# Patient Record
Sex: MALE | Race: WHITE | NOT HISPANIC OR LATINO | Employment: FULL TIME | URBAN - METROPOLITAN AREA
[De-identification: names, ages, dates, MRNs, and addresses within clinical notes are randomized per-mention and may not be internally consistent; named-entity substitution may affect disease eponyms.]

---

## 2018-02-06 ENCOUNTER — HOSPITAL ENCOUNTER (OUTPATIENT)
Dept: RADIOLOGY | Facility: MEDICAL CENTER | Age: 53
End: 2018-02-06
Attending: EMERGENCY MEDICINE
Payer: COMMERCIAL

## 2018-02-06 ENCOUNTER — OCCUPATIONAL MEDICINE (OUTPATIENT)
Dept: URGENT CARE | Facility: PHYSICIAN GROUP | Age: 53
End: 2018-02-06
Payer: COMMERCIAL

## 2018-02-06 ENCOUNTER — TELEPHONE (OUTPATIENT)
Dept: URGENT CARE | Facility: PHYSICIAN GROUP | Age: 53
End: 2018-02-06

## 2018-02-06 VITALS
DIASTOLIC BLOOD PRESSURE: 100 MMHG | OXYGEN SATURATION: 96 % | TEMPERATURE: 98.4 F | BODY MASS INDEX: 34.17 KG/M2 | RESPIRATION RATE: 18 BRPM | WEIGHT: 245 LBS | HEART RATE: 84 BPM | SYSTOLIC BLOOD PRESSURE: 170 MMHG

## 2018-02-06 DIAGNOSIS — S52.021A CLOSED FRACTURE OF RIGHT OLECRANON PROCESS, INITIAL ENCOUNTER: ICD-10-CM

## 2018-02-06 DIAGNOSIS — S50.01XA CONTUSION OF RIGHT ELBOW, INITIAL ENCOUNTER: ICD-10-CM

## 2018-02-06 PROCEDURE — 99214 OFFICE O/P EST MOD 30 MIN: CPT | Performed by: EMERGENCY MEDICINE

## 2018-02-06 PROCEDURE — 73200 CT UPPER EXTREMITY W/O DYE: CPT | Mod: RT

## 2018-02-06 PROCEDURE — 73080 X-RAY EXAM OF ELBOW: CPT | Mod: RT

## 2018-02-06 RX ORDER — HYDROCODONE BITARTRATE AND ACETAMINOPHEN 5; 325 MG/1; MG/1
1 TABLET ORAL EVERY 6 HOURS PRN
Qty: 20 TAB | Refills: 0 | Status: SHIPPED | OUTPATIENT
Start: 2018-02-06 | End: 2018-02-07

## 2018-02-06 ASSESSMENT — ENCOUNTER SYMPTOMS
NERVOUS/ANXIOUS: 0
FALLS: 1
MYALGIAS: 0
NAUSEA: 0
SEIZURES: 0
VOMITING: 0
EYE REDNESS: 0
LOSS OF CONSCIOUSNESS: 0
EYE DISCHARGE: 0
NECK PAIN: 0
CHILLS: 0
ABDOMINAL PAIN: 0
FEVER: 0

## 2018-02-06 NOTE — LETTER
[facility] [address] [place] [address]  Phone:  [phone] - Fax:  [phone]   [facility] Progress Report and Disability Certification  Date of Service: 2/6/2018   No Show:  No  Date / Time of Next Visit: 2/7/2018 with [facility] orthopedics    Claim Information   Patient Name: Kg Ortega  Claim Number:     Employer:    Date of Injury: 2/6/2018     Insurer / TPA: [employer]  ID / SSN:     Occupation:   Diagnosis: Diagnoses of Contusion of right elbow, initial encounter and Closed fracture of right olecranon process, initial encounter were pertinent to this visit.    Medical Information   Related to Industrial Injury? Yes    Subjective Complaints:  DOI : 2/6/18  Pt fell down flight of stairs on job site for [employer] at 2:30 Pt injured right elbow in the fall. No LOC or neck pain , patient is left-hand. Patient has no decreased sensation in his right upper extremity   Objective Findings: Patient's vital signs her blood pressure went over 100, pulse 84 and regular. Rate is per minute with a pulse oximetry of 96% in the temperature of 36.9.    Patient has an extremely swollen right elbow primarily involving his olecranon bursa. X-rays show a comminuted fracture   Pre-Existing Condition(s): None   Assessment:   Initial Visit    Status: Additional Care Required  Permanent Disability:  Comments:unknown    Plan: MedicationTransfer Care  Comments:patient will have x-rays be placed in H splint and referred to orthopedic surgery. Patient will be given Norco for his pain. He will not drive. He'll be a transfer care to orthopedic surgery.    Diagnostics: X-rayCT  Comments:both elbow x-ray and CT show comminuted fracture of the right olecranon with distraction of 2.5 cm large hematoma.    Comments:  Dr. Guadalupe was consulted ×2 the final decision was to have the patient follow-up tomorrow at [facility] orthopedics to  schedule surgery patient was given a referral as well as narcotics. He was placed in a sling at 120°. With a sling and ice    Disability Information   Status: Temporarily Totally Disabled    From:  2/6/2018  Through: 2/7/2018 Restrictions are: Temporary   Physical Restrictions   Sitting:    Standing:    Stooping:    Bending:      Squatting:    Walking:    Climbing:    Pushing:      Pulling:    Other:    Reaching Above Shoulder (L):   Reaching Above Shoulder (R):       Reaching Below Shoulder (L):    Reaching Below Shoulder (R):      Not to exceed Weight Limits   Carrying(hrs):   Weight Limit(lb):   Lifting(hrs):   Weight  Limit(lb):     Comments:      Repetitive Actions   Hands: i.e. Fine Manipulations from Grasping:     Feet: i.e. Operating Foot Controls:     Driving / Operate Machinery:     Physician Name: Guillermo Parrish M.D. Physician Signature: JIHAN Hull M.D. e-Signature: Dr. Lucius Botello, Medical Director   Clinic Name / Location: [facility] [address] [address] Clinic Phone Number: Dept: [phone]   Appointment Time: 4:00 Pm Visit Start Time: 3:59 PM   Check-In Time:  3:58 Pm Visit Discharge Time: 5:41 PM    Original-Treating Physician or Chiropractor    Page 2-Insurer/TPA    Page 3-Employer    Page 4-Employee

## 2018-02-06 NOTE — LETTER
EMPLOYEE’S CLAIM FOR COMPENSATION/ REPORT OF INITIAL TREATMENT  FORM C-4    EMPLOYEE’S CLAIM - PROVIDE ALL INFORMATION REQUESTED   First Name  Kg Last Name  Jordan Birthdate                    1965                Sex  male Claim Number   Home Address  [address] Age  52 y.o. Height  5F 11I Weight  111.1 kg (245 lb) [facility]     [facility] Zip  [address] Telephone  [phone] (home)    Mailing Address  [address] [place] Zip  [address] Primary Language Spoken  English    Insurer   Third Party   [facility]   Employee's Occupation (Job Title) When Injury or Occupational Disease Occurred      Employer's Name     Telephone  [phone]    Employer Address  [address] [address]  [employer] [place] [facility]  [id number]    Date of Injury  2/6/2018               Hour of Injury  2:30 PM Date Employer Notified  2/6/2018 Last Day of Work after Injury or Occupational Disease  2/6/2018 Supervisor to Whom Injury Reported  Ron Odell   Address or Location of Accident (if applicable)  [[facility]]   What were you doing at the time of accident? (if applicable)  [employer] up the [place]    How did this injury or occupational disease occur? (Be specific an answer in detail. Use additional sheet if necessary)  Fell fown stairs halling matreal up stuck pipe into stud   If you believe that you have an occupational disease, when did you first have knowledge of the disability and it relationship to your employment?  n/a Witnesses to the Accident  ron odell      Nature of Injury or Occupational Disease  Strain  Part(s) of Body Injured or Affected  Elbow (R), ,     I certify that the above is true and correct to the best of my knowledge and that I have provided this information in order to obtain the benefits of [place]’s [place]ise[place]  I hereby authorize any physician, chiropractor, surgeon, practitioner, or other person, any hospital, including [facility] or [facility] hospital, any medical service organization, any insurance company, or other institution or organization to release to each other, any medical or other information, including benefits paid or payable, pertinent to this injury or disease, except information relative to diagnosis, treatment and/or counseling for AIDS, psychological conditions, alcohol or controlled substances, for which I must give specific authorization.  A Photostat of this authorization shall be as valid as the original.     Date   Place   Employee’s Signature   THIS REPORT MUST BE COMPLETED AND MAILED WITHIN 3 WORKING DAYS OF TREATMENT   Place  [facility]  Name of Facility  [place]   Date  2/6/2018 Diagnosis  (S50.01XA) Contusion of right elbow, initial encounter  (S52.021A) Closed fracture of right olecranon process, initial encounter Is there evidence the injured employee was under the influence of alcohol and/or another controlled substance at the time of accident?   Hour  3:59 PM Description of Injury or Disease  Diagnoses of Contusion of right elbow, initial encounter and Closed fracture of right olecranon process, initial encounter were pertinent to this visit. No   Treatment  Patient had plain x-rays and a CT. Conversations were had with orthopedic surgery on call Dr. Pulido who recommended follow-up tomorrow in his office patient will call [phone] ([facility] orthopedics) for an appointment with follow-up scheduled for planned surgery the following day or the next Monday.  Have you advised the patient to remain off work five days or more? Yes   X-Ray Findings  Positive   If Yes   From Date2/6/2018 To Date  2/7/2018   From information given by the employee, together with medical evidence, can you directly  "connect this injury or occupational disease as job incurred?  Yes If No Full Duty  No Modified Duty  No   Is additional medical care by a physician indicated?  Yes If Modified Duty, Specify any Limitations / Restrictions  Please refer to the D-39 form patient will receive transfer care to orthopedic surgery.   Do you know of any previous injury or disease contributing to this condition or occupational disease?                            No   Date  2/6/2018 Print Doctor’s Name Guillermo Parrish M.D. I certify the employer’s copy of  this form was mailed on:   Address  [address]  [facility] Insurer’s Use Only     [facility] Zip  [address]    Provider’s Tax ID Number  [id number] Telephone  Dept: [phone]        e-JIHAN Kerns M.D.   e-Signature: Dr. Lucius Botello, Medical Director Degree  MD WILSON-TREATING PHYSICIAN OR CHIROPRACTOR    PAGE 2-INSURER/TPA    PAGE 3-EMPLOYER    PAGE 4-EMPLOYEE             Form C-4 (rev10/07)              BRIEF DESCRIPTION OF RIGHTS AND BENEFITS  (Pursuant to [place] 616C.050)    Notice of Injury or Occupational Disease (Incident Report Form C-1): If an injury or occupational disease (OD) arises out of and in the  course of employment, you must provide written notice to your employer as soon as practicable, but no later than 7 days after the accident or  OD. Your employer shall maintain a sufficient supply of the required forms.    Claim for Compensation (Form C-4): If medical treatment is sought, the form C-4 is available at the place of initial treatment. A completed  \"Claim for Compensation\" (Form C-4) must be filed within 90 days after an accident or OD. The treating physician or chiropractor must,  within 3 working days after treatment, complete and mail to the employer, the employer's insurer and third-party , the Claim for  Compensation.    Medical Treatment: If you require medical treatment for your on-the-job injury or OD, you may be " required to select a physician or  chiropractor from a list provided by your workers’ compensation insurer, if it has contracted with an Organization for Managed Care (MCO) or  Preferred Provider Organization (PPO) or providers of health care. If your employer has not entered into a contract with an MCO or PPO, you  may select a physician or chiropractor from the Panel of Physicians and Chiropractors. Any medical costs related to your industrial injury or  OD will be paid by your insurer.    Temporary Total Disability (TTD): If your doctor has certified that you are unable to work for a period of at least 5 consecutive days, or 5  cumulative days in a 20-day period, or places restrictions on you that your employer does not accommodate, you may be entitled to TTD  compensation.    Temporary Partial Disability (TPD): If the wage you receive upon reemployment is less than the compensation for TTD to which you are  entitled, the insurer may be required to pay you TPD compensation to make up the difference. TPD can only be paid for a maximum of 24  months.    Permanent Partial Disability (PPD): When your medical condition is stable and there is an indication of a PPD as a result of your injury or  OD, within 30 days, your insurer must arrange for an evaluation by a rating physician or chiropractor to determine the degree of your PPD. The  amount of your PPD award depends on the date of injury, the results of the PPD evaluation and your age and wage.    Permanent Total Disability (PTD): If you are medically certified by a treating physician or chiropractor as permanently and totally disabled  and have been granted a PTD status by your insurer, you are entitled to receive monthly benefits not to exceed 66 2/3% of your average  monthly wage. The amount of your PTD payments is subject to reduction if you previously received a PPD award.    Vocational Rehabilitation Services: You may be eligible for vocational rehabilitation  services if you are unable to return to the job due to a  permanent physical impairment or permanent restrictions as a result of your injury or occupational disease.    Transportation and Per Alethea Reimbursement: You may be eligible for travel expenses and per alethea associated with medical treatment.    Reopening: You may be able to reopen your claim if your condition worsens after claim closure.    Appeal Process: If you disagree with a written determination issued by the insurer or the insurer does not respond to your request, you may  appeal to the Department of Administration, , by following the instructions contained in your determination letter. You must  appeal the determination within 70 days from the date of the determination letter at [address], [address], [place], [place]  [address], or [address], [place], [place] [address]. If you disagree with the  decision, you may appeal to the  Department of Administration, . You must file your appeal within 30 days from the date of the  decision  letter at [address], [address], [place], [place] [address], or [address], [place], [place] [address]. If you  disagree with a decision of an , you may file a petition for judicial review with the District Court. You must do so within 30  days of the Appeal Officer’s decision. You may be represented by an  at your own expense or you may contact the [facility] for possible  representation.    [place]  for Injured Workers (NAIW): If you disagree with a  decision, you may request that NAIW represent you  without charge at an  Hearing. For information regarding denial of benefits, you may contact the [facility] at: [address], [address], [place] [address], [phone], or [address], [place] [address], [phone]  [phone]    To File a Complaint with the Division: If you wish to file a complaint with the  of the Division of Industrial Relations (DIR),  please contact the Workers’ Compensation Section, [address], [address], [place], [place] [address], telephone ([phone]) [phone], or  [address], [address], [place], [place] [address], telephone [phone]) [phone].    For assistance with Workers’ Compensation Issues: you may contact the Office of the Governor Consumer Health Assistance, [address] [place] [address], [address], [place], [place] [address], Toll Free 1-659.701.2644, Web site: http://govcha.[facility].[place].us, E-mail  [email or web address].                                                                                                                                                                                                                                   __________________________________________________________________                                                                   _________________                Employee Name / Signature                                                                                                                                                       Date                                                                                                                                                                                                     D-2 (rev. 10/07)

## 2018-02-07 ENCOUNTER — APPOINTMENT (OUTPATIENT)
Dept: RADIOLOGY | Facility: MEDICAL CENTER | Age: 53
End: 2018-02-07
Attending: ORTHOPAEDIC SURGERY
Payer: COMMERCIAL

## 2018-02-07 ENCOUNTER — TELEPHONE (OUTPATIENT)
Dept: URGENT CARE | Facility: PHYSICIAN GROUP | Age: 53
End: 2018-02-07

## 2018-02-07 ENCOUNTER — HOSPITAL ENCOUNTER (OUTPATIENT)
Facility: MEDICAL CENTER | Age: 53
End: 2018-02-08
Attending: EMERGENCY MEDICINE | Admitting: ORTHOPAEDIC SURGERY
Payer: COMMERCIAL

## 2018-02-07 DIAGNOSIS — G89.18 ACUTE POST-OPERATIVE PAIN: ICD-10-CM

## 2018-02-07 DIAGNOSIS — S42.401B ELBOW FRACTURE, RIGHT, OPEN, INITIAL ENCOUNTER: ICD-10-CM

## 2018-02-07 LAB
ALBUMIN SERPL BCP-MCNC: 4.3 G/DL (ref 3.2–4.9)
ALBUMIN/GLOB SERPL: 1.3 G/DL
ALP SERPL-CCNC: 67 U/L (ref 30–99)
ALT SERPL-CCNC: 26 U/L (ref 2–50)
ANION GAP SERPL CALC-SCNC: 7 MMOL/L (ref 0–11.9)
APTT PPP: 26 SEC (ref 24.7–36)
AST SERPL-CCNC: 20 U/L (ref 12–45)
BASOPHILS # BLD AUTO: 0.1 % (ref 0–1.8)
BASOPHILS # BLD: 0.01 K/UL (ref 0–0.12)
BILIRUB SERPL-MCNC: 0.6 MG/DL (ref 0.1–1.5)
BUN SERPL-MCNC: 19 MG/DL (ref 8–22)
CALCIUM SERPL-MCNC: 9.2 MG/DL (ref 8.5–10.5)
CHLORIDE SERPL-SCNC: 102 MMOL/L (ref 96–112)
CO2 SERPL-SCNC: 28 MMOL/L (ref 20–33)
CREAT SERPL-MCNC: 0.95 MG/DL (ref 0.5–1.4)
EKG IMPRESSION: NORMAL
EOSINOPHIL # BLD AUTO: 0.05 K/UL (ref 0–0.51)
EOSINOPHIL NFR BLD: 0.5 % (ref 0–6.9)
ERYTHROCYTE [DISTWIDTH] IN BLOOD BY AUTOMATED COUNT: 43.1 FL (ref 35.9–50)
GLOBULIN SER CALC-MCNC: 3.2 G/DL (ref 1.9–3.5)
GLUCOSE SERPL-MCNC: 103 MG/DL (ref 65–99)
HCT VFR BLD AUTO: 44.9 % (ref 42–52)
HGB BLD-MCNC: 15.2 G/DL (ref 14–18)
IMM GRANULOCYTES # BLD AUTO: 0.06 K/UL (ref 0–0.11)
IMM GRANULOCYTES NFR BLD AUTO: 0.7 % (ref 0–0.9)
INR PPP: 0.99 (ref 0.87–1.13)
LYMPHOCYTES # BLD AUTO: 2.02 K/UL (ref 1–4.8)
LYMPHOCYTES NFR BLD: 22.1 % (ref 22–41)
MCH RBC QN AUTO: 31.3 PG (ref 27–33)
MCHC RBC AUTO-ENTMCNC: 33.9 G/DL (ref 33.7–35.3)
MCV RBC AUTO: 92.6 FL (ref 81.4–97.8)
MONOCYTES # BLD AUTO: 0.86 K/UL (ref 0–0.85)
MONOCYTES NFR BLD AUTO: 9.4 % (ref 0–13.4)
NEUTROPHILS # BLD AUTO: 6.12 K/UL (ref 1.82–7.42)
NEUTROPHILS NFR BLD: 67.2 % (ref 44–72)
NRBC # BLD AUTO: 0 K/UL
NRBC BLD-RTO: 0 /100 WBC
PLATELET # BLD AUTO: 287 K/UL (ref 164–446)
PMV BLD AUTO: 10.1 FL (ref 9–12.9)
POTASSIUM SERPL-SCNC: 3.9 MMOL/L (ref 3.6–5.5)
PROT SERPL-MCNC: 7.5 G/DL (ref 6–8.2)
PROTHROMBIN TIME: 12.8 SEC (ref 12–14.6)
RBC # BLD AUTO: 4.85 M/UL (ref 4.7–6.1)
SODIUM SERPL-SCNC: 137 MMOL/L (ref 135–145)
TROPONIN I SERPL-MCNC: <0.01 NG/ML (ref 0–0.04)
WBC # BLD AUTO: 9.1 K/UL (ref 4.8–10.8)

## 2018-02-07 PROCEDURE — 80053 COMPREHEN METABOLIC PANEL: CPT

## 2018-02-07 PROCEDURE — 501838 HCHG SUTURE GENERAL: Performed by: ORTHOPAEDIC SURGERY

## 2018-02-07 PROCEDURE — 500881 HCHG PACK, EXTREMITY: Performed by: ORTHOPAEDIC SURGERY

## 2018-02-07 PROCEDURE — 160041 HCHG SURGERY MINUTES - EA ADDL 1 MIN LEVEL 4: Performed by: ORTHOPAEDIC SURGERY

## 2018-02-07 PROCEDURE — 700101 HCHG RX REV CODE 250

## 2018-02-07 PROCEDURE — 85610 PROTHROMBIN TIME: CPT

## 2018-02-07 PROCEDURE — 501445 HCHG STAPLER, SKIN DISP: Performed by: ORTHOPAEDIC SURGERY

## 2018-02-07 PROCEDURE — 73070 X-RAY EXAM OF ELBOW: CPT | Mod: RT

## 2018-02-07 PROCEDURE — 160002 HCHG RECOVERY MINUTES (STAT): Performed by: ORTHOPAEDIC SURGERY

## 2018-02-07 PROCEDURE — 700111 HCHG RX REV CODE 636 W/ 250 OVERRIDE (IP): Performed by: EMERGENCY MEDICINE

## 2018-02-07 PROCEDURE — 85025 COMPLETE CBC W/AUTO DIFF WBC: CPT

## 2018-02-07 PROCEDURE — 700111 HCHG RX REV CODE 636 W/ 250 OVERRIDE (IP)

## 2018-02-07 PROCEDURE — 84484 ASSAY OF TROPONIN QUANT: CPT

## 2018-02-07 PROCEDURE — 160009 HCHG ANES TIME/MIN: Performed by: ORTHOPAEDIC SURGERY

## 2018-02-07 PROCEDURE — 160036 HCHG PACU - EA ADDL 30 MINS PHASE I: Performed by: ORTHOPAEDIC SURGERY

## 2018-02-07 PROCEDURE — 160048 HCHG OR STATISTICAL LEVEL 1-5: Performed by: ORTHOPAEDIC SURGERY

## 2018-02-07 PROCEDURE — 85730 THROMBOPLASTIN TIME PARTIAL: CPT

## 2018-02-07 PROCEDURE — G0378 HOSPITAL OBSERVATION PER HR: HCPCS

## 2018-02-07 PROCEDURE — 160029 HCHG SURGERY MINUTES - 1ST 30 MINS LEVEL 4: Performed by: ORTHOPAEDIC SURGERY

## 2018-02-07 PROCEDURE — C1713 ANCHOR/SCREW BN/BN,TIS/BN: HCPCS | Performed by: ORTHOPAEDIC SURGERY

## 2018-02-07 PROCEDURE — 160022 HCHG BLOCK: Performed by: ORTHOPAEDIC SURGERY

## 2018-02-07 PROCEDURE — 99291 CRITICAL CARE FIRST HOUR: CPT

## 2018-02-07 PROCEDURE — 93005 ELECTROCARDIOGRAM TRACING: CPT | Performed by: EMERGENCY MEDICINE

## 2018-02-07 PROCEDURE — 96365 THER/PROPH/DIAG IV INF INIT: CPT

## 2018-02-07 PROCEDURE — 160035 HCHG PACU - 1ST 60 MINS PHASE I: Performed by: ORTHOPAEDIC SURGERY

## 2018-02-07 DEVICE — IMPLANTABLE DEVICE: Type: IMPLANTABLE DEVICE | Status: FUNCTIONAL

## 2018-02-07 DEVICE — PLATE OLECRANON LOCKING STERILE 6 HOLE RIGHT 2.7/3.5MM X 81MM (2TX1=2): Type: IMPLANTABLE DEVICE | Status: FUNCTIONAL

## 2018-02-07 DEVICE — SCREW SN 3.5X16MM CRTX ST LCK - (2SFX10+2DRX8+1SFSC+10=46): Type: IMPLANTABLE DEVICE | Status: FUNCTIONAL

## 2018-02-07 DEVICE — SCREW SN 3.5X26MM CRTX ST LP - (2SFX5+1SFSCX5=15): Type: IMPLANTABLE DEVICE | Status: FUNCTIONAL

## 2018-02-07 RX ORDER — DEXAMETHASONE SODIUM PHOSPHATE 4 MG/ML
4 INJECTION, SOLUTION INTRA-ARTICULAR; INTRALESIONAL; INTRAMUSCULAR; INTRAVENOUS; SOFT TISSUE
Status: DISCONTINUED | OUTPATIENT
Start: 2018-02-07 | End: 2018-02-08 | Stop reason: HOSPADM

## 2018-02-07 RX ORDER — ENEMA 19; 7 G/133ML; G/133ML
1 ENEMA RECTAL
Status: DISCONTINUED | OUTPATIENT
Start: 2018-02-07 | End: 2018-02-08 | Stop reason: HOSPADM

## 2018-02-07 RX ORDER — MAGNESIUM HYDROXIDE 1200 MG/15ML
LIQUID ORAL
Status: DISCONTINUED | OUTPATIENT
Start: 2018-02-07 | End: 2018-02-07 | Stop reason: HOSPADM

## 2018-02-07 RX ORDER — ACETAMINOPHEN 325 MG/1
650 TABLET ORAL EVERY 6 HOURS
Status: DISCONTINUED | OUTPATIENT
Start: 2018-02-08 | End: 2018-02-08 | Stop reason: HOSPADM

## 2018-02-07 RX ORDER — HYDROMORPHONE HYDROCHLORIDE 2 MG/ML
0.5 INJECTION, SOLUTION INTRAMUSCULAR; INTRAVENOUS; SUBCUTANEOUS
Status: DISCONTINUED | OUTPATIENT
Start: 2018-02-07 | End: 2018-02-08 | Stop reason: HOSPADM

## 2018-02-07 RX ORDER — DOCUSATE SODIUM 100 MG/1
100 CAPSULE, LIQUID FILLED ORAL 2 TIMES DAILY
Status: DISCONTINUED | OUTPATIENT
Start: 2018-02-07 | End: 2018-02-08 | Stop reason: HOSPADM

## 2018-02-07 RX ORDER — POLYETHYLENE GLYCOL 3350 17 G/17G
1 POWDER, FOR SOLUTION ORAL 2 TIMES DAILY PRN
Status: DISCONTINUED | OUTPATIENT
Start: 2018-02-07 | End: 2018-02-08 | Stop reason: HOSPADM

## 2018-02-07 RX ORDER — SCOLOPAMINE TRANSDERMAL SYSTEM 1 MG/1
1 PATCH, EXTENDED RELEASE TRANSDERMAL
Status: DISCONTINUED | OUTPATIENT
Start: 2018-02-07 | End: 2018-02-08 | Stop reason: HOSPADM

## 2018-02-07 RX ORDER — AMOXICILLIN 250 MG
1 CAPSULE ORAL NIGHTLY
Status: DISCONTINUED | OUTPATIENT
Start: 2018-02-07 | End: 2018-02-08 | Stop reason: HOSPADM

## 2018-02-07 RX ORDER — HALOPERIDOL 5 MG/ML
1 INJECTION INTRAMUSCULAR EVERY 6 HOURS PRN
Status: DISCONTINUED | OUTPATIENT
Start: 2018-02-07 | End: 2018-02-08 | Stop reason: HOSPADM

## 2018-02-07 RX ORDER — DIPHENHYDRAMINE HYDROCHLORIDE 50 MG/ML
25 INJECTION INTRAMUSCULAR; INTRAVENOUS EVERY 6 HOURS PRN
Status: DISCONTINUED | OUTPATIENT
Start: 2018-02-07 | End: 2018-02-08 | Stop reason: HOSPADM

## 2018-02-07 RX ORDER — CEFAZOLIN SODIUM 2 G/100ML
2 INJECTION, SOLUTION INTRAVENOUS ONCE
Status: COMPLETED | OUTPATIENT
Start: 2018-02-07 | End: 2018-02-07

## 2018-02-07 RX ORDER — AMOXICILLIN 250 MG
1 CAPSULE ORAL
Status: DISCONTINUED | OUTPATIENT
Start: 2018-02-07 | End: 2018-02-08 | Stop reason: HOSPADM

## 2018-02-07 RX ORDER — KETOROLAC TROMETHAMINE 30 MG/ML
30 INJECTION, SOLUTION INTRAMUSCULAR; INTRAVENOUS EVERY 6 HOURS
Status: DISCONTINUED | OUTPATIENT
Start: 2018-02-08 | End: 2018-02-08 | Stop reason: HOSPADM

## 2018-02-07 RX ORDER — CEFTRIAXONE 1 G/1
1 INJECTION, POWDER, FOR SOLUTION INTRAMUSCULAR; INTRAVENOUS ONCE
Status: COMPLETED | OUTPATIENT
Start: 2018-02-07 | End: 2018-02-07

## 2018-02-07 RX ORDER — OXYCODONE HYDROCHLORIDE 5 MG/1
5 TABLET ORAL
Status: DISCONTINUED | OUTPATIENT
Start: 2018-02-07 | End: 2018-02-08 | Stop reason: HOSPADM

## 2018-02-07 RX ORDER — BISACODYL 10 MG
10 SUPPOSITORY, RECTAL RECTAL
Status: DISCONTINUED | OUTPATIENT
Start: 2018-02-07 | End: 2018-02-08 | Stop reason: HOSPADM

## 2018-02-07 RX ORDER — CEFAZOLIN SODIUM 2 G/100ML
2 INJECTION, SOLUTION INTRAVENOUS EVERY 8 HOURS
Status: DISCONTINUED | OUTPATIENT
Start: 2018-02-07 | End: 2018-02-07

## 2018-02-07 RX ORDER — HYDROMORPHONE HYDROCHLORIDE 2 MG/ML
0.5 INJECTION, SOLUTION INTRAMUSCULAR; INTRAVENOUS; SUBCUTANEOUS
Status: DISCONTINUED | OUTPATIENT
Start: 2018-02-07 | End: 2018-02-07

## 2018-02-07 RX ORDER — OXYCODONE HYDROCHLORIDE 10 MG/1
10 TABLET ORAL
Status: DISCONTINUED | OUTPATIENT
Start: 2018-02-07 | End: 2018-02-08 | Stop reason: HOSPADM

## 2018-02-07 RX ORDER — ONDANSETRON 2 MG/ML
4 INJECTION INTRAMUSCULAR; INTRAVENOUS EVERY 4 HOURS PRN
Status: DISCONTINUED | OUTPATIENT
Start: 2018-02-07 | End: 2018-02-08 | Stop reason: HOSPADM

## 2018-02-07 RX ORDER — CEFAZOLIN SODIUM 2 G/100ML
2 INJECTION, SOLUTION INTRAVENOUS EVERY 8 HOURS
Status: COMPLETED | OUTPATIENT
Start: 2018-02-07 | End: 2018-02-08

## 2018-02-07 RX ADMIN — CEFTRIAXONE 1 G: 1 INJECTION, POWDER, FOR SOLUTION INTRAMUSCULAR; INTRAVENOUS at 11:13

## 2018-02-07 RX ADMIN — CEFAZOLIN SODIUM 2 G: 2 INJECTION, SOLUTION INTRAVENOUS at 14:30

## 2018-02-07 ASSESSMENT — PAIN SCALES - GENERAL
PAINLEVEL_OUTOF10: 0
PAINLEVEL_OUTOF10: 3
PAINLEVEL_OUTOF10: 1

## 2018-02-07 ASSESSMENT — PATIENT HEALTH QUESTIONNAIRE - PHQ9
SUM OF ALL RESPONSES TO PHQ9 QUESTIONS 1 AND 2: 0
2. FEELING DOWN, DEPRESSED, IRRITABLE, OR HOPELESS: NOT AT ALL
1. LITTLE INTEREST OR PLEASURE IN DOING THINGS: NOT AT ALL
SUM OF ALL RESPONSES TO PHQ QUESTIONS 1-9: 0

## 2018-02-07 ASSESSMENT — LIFESTYLE VARIABLES
ALCOHOL_USE: NO
EVER_SMOKED: NEVER

## 2018-02-07 NOTE — TELEPHONE ENCOUNTER
When the patient left the urgent care I was reviewing the CT and the official reading questioned whether there was gasin the joint consistent with possible open fracture. I contacted the patient to no avail and subsequently contacted him again neither phone call was answered nor was there capability of leaving a voice message.. I will try to get the patient in the morning  To evaluate his wound for possible puncture wound that was over looked in the urgent care..

## 2018-02-07 NOTE — LETTER
ORTHOPEDICS [address] [address]  Phone: Dept: [phone] - Fax:        [facility] Progress Report and Disability Certification  Date of Service: 2/7/2018   No Show:  No  Date / Time of Next Visit:     Claim Information   Patient Name: Kg Ortega  Claim Number:     Employer:   [employer] Date of Injury: 2/6/2018     Insurer / TPA: [employer] ID / SSN:     Occupation:  Diagnosis: Diagnoses of Elbow fracture, right, open, initial encounter and Acute post-operative pain were pertinent to this visit.    Medical Information   Related to Industrial Injury?   ***   Subjective Complaints:  Elbow pain   Objective Findings: Open elbow fracture   Pre-Existing Condition(s):     Assessment:   Initial Visit    Status: Additional Care Required  Permanent Disability:     Plan: Consultation    Diagnostics:      Comments:       Disability Information   Status:      From:     Through:   Restrictions are:     Physical Restrictions   Sitting:    Standing:    Stooping:    Bending:      Squatting:    Walking:    Climbing:    Pushing:      Pulling:    Other:    Reaching Above Shoulder (L):   Reaching Above Shoulder (R):       Reaching Below Shoulder (L):    Reaching Below Shoulder (R):      Not to exceed Weight Limits   Carrying(hrs):   Weight Limit(lb):   Lifting(hrs):   Weight  Limit(lb):     Comments:      Repetitive Actions   Hands: i.e. Fine Manipulations from Grasping:     Feet: i.e. Operating Foot Controls:     Driving / Operate Machinery:     Physician Name: Ernesto Cho Physician Signature: ERNESTO Ha M.D. e-Signature:  , Medical Director   Clinic Name / Location: [facility]  ORTHOPEDICS [address] [address]  [phone]     Clinic Phone Number: Dept: [phone]   Appointment Time:  Visit Start Time:    Check-In Time:  12:44 PM Visit Discharge Time: 2:34 PM      Original-Treating Physician or Chiropractor    Page 2-Insurer/TPA    Page 3-Employer    Page 4-Employee

## 2018-02-07 NOTE — TELEPHONE ENCOUNTER
I contacted the patient regarding possible intra-articular air and having come back to the urgent care and on examining his elbow there was a pinhole possible N [place] wound over his olecranon approximately 1 mm in size. I subsequently called Dr. Guadalupe to discuss follow-up with the patient ASAP.    I spoke to the medical assistant and she is going to have him call me if soon as she finishes with his next patient.

## 2018-02-07 NOTE — CONSULTS
"2/7/2018    Kg Ortega is a 52 y.o. male who presents after a fall with a right elbow fracture and is here for operative management. Patient denies numbness, parasthesias, loss of concousness or other trauma    Past Medical History:   Diagnosis Date   • HTN (hypertension) 1/3/2012   • Hypothyroidism    • Monocytosis    • Neuropathic pain 1/3/2012   • Vitamin D deficiency        History reviewed. No pertinent surgical history.    Medications  No current facility-administered medications on file prior to encounter.      Current Outpatient Prescriptions on File Prior to Encounter   Medication Sig Dispense Refill   • HYDROcodone-acetaminophen (NORCO) 5-325 MG Tab per tablet Take 1 Tab by mouth every 6 hours as needed for up to 10 days. 20 Tab 0   • doxazosin (CARDURA) 1 MG Tab TAKE ONE TABLET BY MOUTH ONCE DAILY 30 Tab 0   • losartan (COZAAR) 100 MG Tab TAKE ONE TABLET BY MOUTH ONCE DAILY 30 Tab 0   • levothyroxine (LEVOXYL) 75 MCG Tab Take 1 Tab by mouth every day. 90 Tab 3       Allergies  Patient has no known allergies.    ROS  Right elbow pain. All other systems were reviewed and found to be negative    Family History   Problem Relation Age of Onset   • Cancer Mother      lung   • Hypertension Father    • Hypertension Brother    • Cancer Maternal Grandfather    • Heart Disease Paternal Grandfather        Social History     Social History   • Marital status: Single     Spouse name: N/A   • Number of children: N/A   • Years of education: N/A     Social History Main Topics   • Smoking status: Never Smoker   • Smokeless tobacco: Never Used   • Alcohol use No      Comment: rare   • Drug use: No   • Sexual activity: Not on file     Other Topics Concern   • Not on file     Social History Narrative   • No narrative on file       Physical Exam  Vitals  Blood pressure (!) 162/94, pulse 83, temperature 36.8 °C (98.3 °F), resp. rate 16, height 1.803 m (5' 11\"), weight 109 kg (240 lb 4.8 oz), SpO2 97 %.  General: Well " Developed, Well Nourished, no acute distress  HEENT: Normocephalic, atraumatic  Eyes: Anicteric, PERRLA, EOMI  Neck: Supple, nontender, no masses  Lungs: CTA, no wheezes or crackles  Heart: RRR, no murmurs, rubs or gallops  Abdomen: Soft, NT, ND  Pelvis: Stable to AP and Lateral Compression  Skin: Intact, no open wounds  Extremities: Right elbow pain and deformity  Neuro: NVI  Vascular: 2+rad/Uln, Capillary refill <2 seconds    Radiographs:  Displaced olecrannon fracture  No orders to display       Laboratory Values      No results for input(s): SODIUM, POTASSIUM, CHLORIDE, CO2, GLUCOSE, BUN, CPKTOTAL in the last 72 hours.          Impression: Grade I Open olecrannon fracture    Plan:Operative intervention. Risks and benefits of surgery were discussed which include but are not limited to bleeding, infection, neurovascular damage, malunion, nonunion, instability, limb length discrepancy, DVT, PE, MI, Stroke and death. They understand these risks and wish to proceed.

## 2018-02-07 NOTE — TELEPHONE ENCOUNTER
I contacted the patient with the CT reading suspicious for air in the joint patients going to come back to the urgent care this morning 2/7/18 for a quick wound check.

## 2018-02-07 NOTE — PROGRESS NOTES
Subjective:      Kg Ortega is a 52 y.o. male who presents with Work-Related Injury (R elbow injury, swelling)      DOI : 2/6/18  Pt fell down flight of stairs on job site for [employer] at 2:30 Pt injured right elbow in the fall. No LOC or neck pain , patient is left-hand. Patient has no decreased sensation in his right upper extremity   No Known Allergies   Social History     Social History   • Marital status: Single     Spouse name: N/A   • Number of children: N/A   • Years of education: N/A     Occupational History   • Not on file.     Social History Main Topics   • Smoking status: Never Smoker   • Smokeless tobacco: Never Used   • Alcohol use No      Comment: rare   • Drug use: No   • Sexual activity: Not on file     Other Topics Concern   • Not on file     Social History Narrative   • No narrative on file     Past Medical History:   Diagnosis Date   • HTN (hypertension) 1/3/2012   • Hypothyroidism    • Monocytosis    • Neuropathic pain 1/3/2012   • Vitamin D deficiency      Current Outpatient Prescriptions on File Prior to Visit   Medication Sig Dispense Refill   • doxazosin (CARDURA) 1 MG Tab TAKE ONE TABLET BY MOUTH ONCE DAILY 30 Tab 0   • losartan (COZAAR) 100 MG Tab TAKE ONE TABLET BY MOUTH ONCE DAILY 30 Tab 0   • levothyroxine (LEVOXYL) 75 MCG Tab Take 1 Tab by mouth every day. 90 Tab 3     No current facility-administered medications on file prior to visit.    family history includes Cancer in his maternal grandfather and mother; Heart Disease in his paternal grandfather; Hypertension in his brother and father.  HPI    Review of Systems   Constitutional: Negative for chills and fever.   Eyes: Negative for discharge and redness.   Cardiovascular: Negative for chest pain.   Gastrointestinal: Negative for abdominal pain, nausea and vomiting.   Musculoskeletal: Positive for falls and joint pain. Negative for myalgias and neck pain.   Skin: Positive for rash.   Neurological: Negative for seizures  and loss of consciousness.   Psychiatric/Behavioral: The patient is not nervous/anxious.           Objective:     BP (!) 170/100   Pulse 84   Temp 36.9 °C (98.4 °F)   Resp 18   Wt 111.1 kg (245 lb)   SpO2 96%   BMI 34.17 kg/m²      Physical Exam    Patient's vital signs her blood pressure went over 100, pulse 84 and regular. Rate is per minute with a pulse oximetry of 96% in the temperature of 36.9.    Patient has an extremely swollen right elbow primarily involving his olecranon bursa. X-rays show a comminuted fracture       Assessment/Plan:     1. Contusion of right elbow, initial encounter    - DX-ELBOW-COMPLETE 3+ RIGHT; Future  - HYDROcodone-acetaminophen (NORCO) 5-325 MG Tab per tablet; Take 1 Tab by mouth every 6 hours as needed for up to 10 days.  Dispense: 20 Tab; Refill: 0  - CONSENT FOR OPIATE PRESCRIPTION  - CT-ELBOW W/O RIGHT; Future  - REFERRAL TO ORTHOPEDICS    2. Closed fracture of right olecranon process, initial encounter    - REFERRAL TO ORTHOPEDICS    Please refer to the D-39 form below okaytransfer care to orthopedic surgery

## 2018-02-07 NOTE — ED TRIAGE NOTES
"Chief Complaint   Patient presents with   • Sent by MD     Pt broke elbow, sent by MD at urgent care for intra-articular air, MD notified Dr Guadalupe for surgery.    BP (!) 162/94   Pulse 83   Temp 36.8 °C (98.3 °F)   Resp 16   Ht 1.803 m (5' 11\")   Wt 109 kg (240 lb 4.8 oz)   SpO2 97%   BMI 33.52 kg/m²     Pt Informed regarding triage process and verbalized understanding to inform triage tech or RN for any changes in condition.  Placed in lobby.    "

## 2018-02-07 NOTE — ED PROVIDER NOTES
ED Provider Note    Scribed for Ernesto Cho M.D. by Michael Alexander. 2/7/2018  1:40 PM    Primary care provider: BURAK Deng  Means of arrival: Walk-in  History obtained from: Patient  History limited by: None    CHIEF COMPLAINT  Chief Complaint   Patient presents with   • Sent by MD RIGGS  Kg Ortega is a 52 y.o. male who presents to the Emergency Department for evaluation after he suffered a mechanical fall down a flight of stairs and fractured his right elbow. The patient was seen at Urgent Care yesterday. No laceration was visualize at Urgent Care yesterday. Dr. Guadalupe, Orthopedics, was consulted and he asked to a have a CT scan ordered, the results of which are described below. Patient was subsequently referred here for evaluation. The patient arrived in an elbow splint. He states that he has been NPO since 8:30 AM this morning. He denies chest pain, shortness of breath, fever, or chills. He reports a history of hypertension and hypothyroidism.    REVIEW OF SYSTEMS  Pertinent positives include right elbow fracture. Pertinent negatives include no chest pain, shortness of breath, fever, or chills.    All other systems negative.  C    PAST MEDICAL HISTORY   has a past medical history of HTN (hypertension) (1/3/2012); Hypothyroidism; Monocytosis; Neuropathic pain (1/3/2012); and Vitamin D deficiency.Elbow fracture.    SURGICAL HISTORY  patient denies any surgical history    SOCIAL HISTORY  Social History   Substance Use Topics   • Smoking status: Never Smoker   • Smokeless tobacco: Never Used   • Alcohol use No      Comment: rare      History   Drug Use No       FAMILY HISTORY  Family History   Problem Relation Age of Onset   • Cancer Mother      lung   • Hypertension Father    • Hypertension Brother    • Cancer Maternal Grandfather    • Heart Disease Paternal Grandfather        CURRENT MEDICATIONS  No current facility-administered medications on file prior to encounter.      No  "current outpatient prescriptions on file prior to encounter.       ALLERGIES  No Known Allergies    PHYSICAL EXAM  VITAL SIGNS: BP (!) 162/94   Pulse 83   Temp 36.8 °C (98.3 °F)   Resp 16   Ht 1.803 m (5' 11\")   Wt 109 kg (240 lb 4.8 oz)   SpO2 97%   BMI 33.52 kg/m²     Constitutional: Well developed, Well nourished, No acute distress, Non-toxic appearance.   HENT: Normocephalic, Atraumatic.  Oropharynx moist.   Eyes: PERRL, EOMI, Conjunctiva normal, No discharge.   CV: Good pulses  Thorax & Lungs: No respiratory distress.   Skin: Warm, Dry, No erythema, No rash. Large amount of soft tissue swelling and ecchymosis of the right elbow. Small abrasion around the olecranon.  Musculoskeletal: Decreased range of motion of the right elbow secondary to pain.  Neurologic: Awake, alert. Moves all extremities spontaneously.  Psychiatric: Affect normal, Mood normal.    LABS  Labs Reviewed   CBC WITH DIFFERENTIAL - Abnormal; Notable for the following:        Result Value    Monos (Absolute) 0.86 (*)     All other components within normal limits    Narrative:     Indicate which anticoagulants the patient is on:->UNKNOWN   COMP METABOLIC PANEL - Abnormal; Notable for the following:     Glucose 103 (*)     All other components within normal limits    Narrative:     Indicate which anticoagulants the patient is on:->UNKNOWN   APTT    Narrative:     Indicate which anticoagulants the patient is on:->UNKNOWN   PROTHROMBIN TIME    Narrative:     Indicate which anticoagulants the patient is on:->UNKNOWN   ESTIMATED GFR    Narrative:     Indicate which anticoagulants the patient is on:->UNKNOWN     All labs reviewed by me.    Results for orders placed or performed during the hospital encounter of 02/07/18   CBC WITH DIFFERENTIAL   Result Value Ref Range    WBC 9.1 4.8 - 10.8 K/uL    RBC 4.85 4.70 - 6.10 M/uL    Hemoglobin 15.2 14.0 - 18.0 g/dL    Hematocrit 44.9 42.0 - 52.0 %    MCV 92.6 81.4 - 97.8 fL    MCH 31.3 27.0 - 33.0 pg    " MCHC 33.9 33.7 - 35.3 g/dL    RDW 43.1 35.9 - 50.0 fL    Platelet Count 287 164 - 446 K/uL    MPV 10.1 9.0 - 12.9 fL    Neutrophils-Polys 67.20 44.00 - 72.00 %    Lymphocytes 22.10 22.00 - 41.00 %    Monocytes 9.40 0.00 - 13.40 %    Eosinophils 0.50 0.00 - 6.90 %    Basophils 0.10 0.00 - 1.80 %    Immature Granulocytes 0.70 0.00 - 0.90 %    Nucleated RBC 0.00 /100 WBC    Neutrophils (Absolute) 6.12 1.82 - 7.42 K/uL    Lymphs (Absolute) 2.02 1.00 - 4.80 K/uL    Monos (Absolute) 0.86 (H) 0.00 - 0.85 K/uL    Eos (Absolute) 0.05 0.00 - 0.51 K/uL    Baso (Absolute) 0.01 0.00 - 0.12 K/uL    Immature Granulocytes (abs) 0.06 0.00 - 0.11 K/uL    NRBC (Absolute) 0.00 K/uL   COMP METABOLIC PANEL   Result Value Ref Range    Sodium 137 135 - 145 mmol/L    Potassium 3.9 3.6 - 5.5 mmol/L    Chloride 102 96 - 112 mmol/L    Co2 28 20 - 33 mmol/L    Anion Gap 7.0 0.0 - 11.9    Glucose 103 (H) 65 - 99 mg/dL    Bun 19 8 - 22 mg/dL    Creatinine 0.95 0.50 - 1.40 mg/dL    Calcium 9.2 8.5 - 10.5 mg/dL    AST(SGOT) 20 12 - 45 U/L    ALT(SGPT) 26 2 - 50 U/L    Alkaline Phosphatase 67 30 - 99 U/L    Total Bilirubin 0.6 0.1 - 1.5 mg/dL    Albumin 4.3 3.2 - 4.9 g/dL    Total Protein 7.5 6.0 - 8.2 g/dL    Globulin 3.2 1.9 - 3.5 g/dL    A-G Ratio 1.3 g/dL   APTT   Result Value Ref Range    APTT 26.0 24.7 - 36.0 sec   PROTHROMBIN TIME   Result Value Ref Range    PT 12.8 12.0 - 14.6 sec    INR 0.99 0.87 - 1.13   ESTIMATED GFR   Result Value Ref Range    GFR If African American >60 >60 mL/min/1.73 m 2    GFR If Non African American >60 >60 mL/min/1.73 m 2   EKG (NOW)   Result Value Ref Range    Report       [facility] Emergency Dept.    Test Date:  2018  Pt Name:    STEPHON MARTÍNEZ           Department: ER  MRN:        [id number]                      Room:       [facility]  Gender:     Male                         Technician: [id number]  :        1965                   Requested By:ROGER COOPER  Order #:    [id number]                     Reading MD: ROGER COOPER MD    Measurements  Intervals                                Axis  Rate:       70                           P:          65  AL:         152                          QRS:        50  QRSD:       92                           T:          40  QT:         388  QTc:        419    Interpretive Statements  SINUS RHYTHM  BASELINE WANDER IN LEAD(S) V1  No previous ECG available for comparison    Electronically Signed On 2-7-2018 14:34:46 PST by ROGER COOPER MD            COURSE & MEDICAL DECISION MAKING  Nursing notes, VS, PMSFHx reviewed in chart.    Review of past medical records shows the patient received a CT scan of the RIGHT elbow without contrast yesterday, which revealed:    1. Comminuted olecranon base fracture is identified with 26 mm displacement. 2. There is moderate impaction of the distal fragment and there is extensive articular surface displacement. 3. No distal humerus or radial fracture is identified 4. Several small gas foci are identified in the joint space along with a large effusion 4. There is extensive dorsal subcutaneous fat fluid and hemorrhage. Bony fragments are seen posteriorly extending towards the skin surface as well 5. Radiohumeral relationships are normal    1:40 PM - Patient seen and examined at bedside. Patient will be treated with cefazolin IVPB 2 g. Ordered EKG, CBC with differential, CMP, APTT, and PTT to evaluate his symptoms.     2:00 PM Paged Orthopedics.    2:11 PM I discussed the patient's case and the above findings with Dr. Guadalupe (Orthopedics) who would like me to admit to Dr. Zuniga, Orthopedics.    2:10 PM I discussed the patient's case and the above findings with Dr. Zuniga (Orthopedics) who agrees to admit the patient and will transfer care of the patient at this time.    Decision Making:  Open elbow fracture, discussed the case with Dr. Zuniga, he'll take the patient to operating room, given the patient  antibiotics,    DISPOSITION:  Patient will be admitted to Dr. Zuniga (Orthopedics) in guarded condition.    FINAL IMPRESSION  1. Elbow fracture, right, open, initial encounter          I, Michael Alexander (Scribe), am scribing for, and in the presence of, Ernesto Cho M.D..    Electronically signed by: Michael Alexander (Scribe), 2/7/2018    IErnesto M.D. personally performed the services described in this documentation, as scribed by Michael Alexander in my presence, and it is both accurate and complete.    The note accurately reflects work and decisions made by me.  Ernesto Cho  2/7/2018  6:57 PM

## 2018-02-08 VITALS
SYSTOLIC BLOOD PRESSURE: 167 MMHG | TEMPERATURE: 99.2 F | HEART RATE: 74 BPM | HEIGHT: 71 IN | RESPIRATION RATE: 15 BRPM | OXYGEN SATURATION: 93 % | WEIGHT: 240.3 LBS | DIASTOLIC BLOOD PRESSURE: 78 MMHG | BODY MASS INDEX: 33.64 KG/M2

## 2018-02-08 PROCEDURE — G8978 MOBILITY CURRENT STATUS: HCPCS | Mod: CH

## 2018-02-08 PROCEDURE — G8987 SELF CARE CURRENT STATUS: HCPCS | Mod: CI

## 2018-02-08 PROCEDURE — 700112 HCHG RX REV CODE 229: Performed by: ORTHOPAEDIC SURGERY

## 2018-02-08 PROCEDURE — 700102 HCHG RX REV CODE 250 W/ 637 OVERRIDE(OP): Performed by: ORTHOPAEDIC SURGERY

## 2018-02-08 PROCEDURE — A9270 NON-COVERED ITEM OR SERVICE: HCPCS | Performed by: ORTHOPAEDIC SURGERY

## 2018-02-08 PROCEDURE — G8980 MOBILITY D/C STATUS: HCPCS | Mod: CH

## 2018-02-08 PROCEDURE — G8988 SELF CARE GOAL STATUS: HCPCS | Mod: CI

## 2018-02-08 PROCEDURE — G8979 MOBILITY GOAL STATUS: HCPCS | Mod: CH

## 2018-02-08 PROCEDURE — 97165 OT EVAL LOW COMPLEX 30 MIN: CPT

## 2018-02-08 PROCEDURE — 97161 PT EVAL LOW COMPLEX 20 MIN: CPT

## 2018-02-08 PROCEDURE — 700111 HCHG RX REV CODE 636 W/ 250 OVERRIDE (IP): Performed by: ORTHOPAEDIC SURGERY

## 2018-02-08 PROCEDURE — G0378 HOSPITAL OBSERVATION PER HR: HCPCS

## 2018-02-08 PROCEDURE — 96375 TX/PRO/DX INJ NEW DRUG ADDON: CPT

## 2018-02-08 PROCEDURE — 96376 TX/PRO/DX INJ SAME DRUG ADON: CPT

## 2018-02-08 PROCEDURE — 700105 HCHG RX REV CODE 258

## 2018-02-08 PROCEDURE — G8989 SELF CARE D/C STATUS: HCPCS | Mod: CI

## 2018-02-08 RX ORDER — SODIUM CHLORIDE 9 MG/ML
INJECTION, SOLUTION INTRAVENOUS
Status: COMPLETED
Start: 2018-02-08 | End: 2018-02-08

## 2018-02-08 RX ORDER — DOCUSATE SODIUM 100 MG/1
100 CAPSULE, LIQUID FILLED ORAL 2 TIMES DAILY
Qty: 60 CAP | Refills: 0 | Status: SHIPPED | OUTPATIENT
Start: 2018-02-08 | End: 2022-09-13

## 2018-02-08 RX ORDER — TRAMADOL HYDROCHLORIDE 50 MG/1
50-100 TABLET ORAL EVERY 6 HOURS PRN
Qty: 90 TAB | Refills: 0 | Status: SHIPPED | OUTPATIENT
Start: 2018-02-08 | End: 2018-03-10

## 2018-02-08 RX ORDER — OXYCODONE HYDROCHLORIDE 5 MG/1
5 TABLET ORAL
Qty: 90 TAB | Refills: 0 | Status: SHIPPED | OUTPATIENT
Start: 2018-02-08 | End: 2018-02-08

## 2018-02-08 RX ORDER — PSEUDOEPHEDRINE HCL 30 MG
100 TABLET ORAL 2 TIMES DAILY
Qty: 60 CAP | Refills: 0 | Status: SHIPPED | OUTPATIENT
Start: 2018-02-08 | End: 2022-09-13

## 2018-02-08 RX ORDER — HYDROCODONE BITARTRATE AND ACETAMINOPHEN 5; 325 MG/1; MG/1
1-2 TABLET ORAL EVERY 4 HOURS PRN
Qty: 60 TAB | Refills: 0 | Status: SHIPPED | OUTPATIENT
Start: 2018-02-08 | End: 2018-03-10

## 2018-02-08 RX ADMIN — KETOROLAC TROMETHAMINE 30 MG: 30 INJECTION, SOLUTION INTRAMUSCULAR at 06:00

## 2018-02-08 RX ADMIN — CEFAZOLIN SODIUM 2 G: 2 INJECTION, SOLUTION INTRAVENOUS at 08:22

## 2018-02-08 RX ADMIN — KETOROLAC TROMETHAMINE 30 MG: 30 INJECTION, SOLUTION INTRAMUSCULAR at 00:16

## 2018-02-08 RX ADMIN — CEFAZOLIN SODIUM 2 G: 2 INJECTION, SOLUTION INTRAVENOUS at 00:16

## 2018-02-08 RX ADMIN — KETOROLAC TROMETHAMINE 30 MG: 30 INJECTION, SOLUTION INTRAMUSCULAR at 11:41

## 2018-02-08 RX ADMIN — SODIUM CHLORIDE: 9 INJECTION, SOLUTION INTRAVENOUS at 00:15

## 2018-02-08 RX ADMIN — ACETAMINOPHEN 650 MG: 325 TABLET, FILM COATED ORAL at 00:15

## 2018-02-08 RX ADMIN — DOCUSATE SODIUM 100 MG: 100 CAPSULE ORAL at 00:16

## 2018-02-08 RX ADMIN — STANDARDIZED SENNA CONCENTRATE AND DOCUSATE SODIUM 1 TABLET: 8.6; 5 TABLET, FILM COATED ORAL at 00:16

## 2018-02-08 ASSESSMENT — COGNITIVE AND FUNCTIONAL STATUS - GENERAL
SUGGESTED CMS G CODE MODIFIER MOBILITY: CH
MOBILITY SCORE: 24
DRESSING REGULAR UPPER BODY CLOTHING: A LITTLE
EATING MEALS: A LITTLE
DAILY ACTIVITIY SCORE: 19
DRESSING REGULAR LOWER BODY CLOTHING: A LITTLE
PERSONAL GROOMING: A LITTLE
SUGGESTED CMS G CODE MODIFIER DAILY ACTIVITY: CK
HELP NEEDED FOR BATHING: A LITTLE

## 2018-02-08 ASSESSMENT — ACTIVITIES OF DAILY LIVING (ADL): TOILETING: INDEPENDENT

## 2018-02-08 ASSESSMENT — GAIT ASSESSMENTS
GAIT LEVEL OF ASSIST: MODIFIED INDEPENDENT
DISTANCE (FEET): 500

## 2018-02-08 ASSESSMENT — PAIN SCALES - GENERAL: PAINLEVEL_OUTOF10: 2

## 2018-02-08 NOTE — DISCHARGE INSTRUCTIONS
No Range Of Motion at elbow at this time leave in sling                                                                                                  Non Weight bearing to the Right arm                                                                                          Follow up with Dr Zuniga by calling [phone]      Discharge Instructions    Discharged to home by car with relative. Discharged via wheelchair, hospital escort: Yes.  Special equipment needed: sling    Be sure to schedule a follow-up appointment with your primary care doctor or any specialists as instructed.     Discharge Plan:   Influenza Vaccine Indication: Patient Refuses    I understand that a diet low in cholesterol, fat, and sodium is recommended for good health. Unless I have been given specific instructions below for another diet, I accept this instruction as my diet prescription.   Other diet: Regular    Special Instructions: Discharge instructions for the Orthopedic Patient    Follow up with Primary Care Physician within 2 weeks of discharge to home, regarding:  Review of medications and diagnostic testing.  Surveillance for medical complications.  Workup and treatment of osteoporosis, if appropriate.     -Is this a Joint Replacement patient? No    -Is this patient being discharged with medication to prevent blood clots?  No    · Is patient discharged on Warfarin / Coumadin?   No     Depression / Suicide Risk    As you are discharged from this [facility], it is important to learn how to keep safe from harming yourself.    Recognize the warning signs:  · Abrupt changes in personality, positive or negative- including increase in energy   · Giving away possessions  · Change in eating patterns- significant weight changes-  positive or negative  · Change in sleeping patterns- unable to sleep or sleeping all the time   · Unwillingness or  inability to communicate  · Depression  · Unusual sadness, discouragement and loneliness  · Talk of wanting to die  · Neglect of personal appearance   · Rebelliousness- reckless behavior  · Withdrawal from people/activities they love  · Confusion- inability to concentrate     If you or a loved one observes any of these behaviors or has concerns about self-harm, here's what you can do:  · Talk about it- your feelings and reasons for harming yourself  · Remove any means that you might use to hurt yourself (examples: pills, rope, extension cords, firearm)  · Get professional help from the community (Mental Health, Substance Abuse, psychological counseling)  · Do not be alone:Call your Safe Contact- someone whom you trust who will be there for you.  · Call your local CRISIS HOTLINE 079-4753 or 030-161-4631  · Call your local Children's Mobile Crisis Response Team [place] (087) 276-3938 or www.CloudTran  · Call the toll free National Suicide Prevention Hotlines   · National Suicide Prevention Lifeline 552-792-MKLY (8698)  · National Hope Line Network 800-SUICIDE (746-1233)      Refer to this sheet in the next few weeks. These instructions provide you with information about caring for yourself after your procedure. Your health care provider may also give you more specific instructions. Your treatment has been planned according to current medical practices, but problems sometimes occur. Call your health care provider if you have any problems or questions after your procedure.  WHAT TO EXPECT AFTER THE PROCEDURE  After your procedure, it is common to have:  · Soreness.  · Mild swelling.  · Stiffness.  HOME CARE INSTRUCTIONS  If You Have a Splint:  · Wear it as directed by your health care provider. Remove it only as directed by your health care provider.  · Loosen the splint if your fingers become numb and tingle, or if they turn cold and blue.   Bathing  · Do not take baths, swim, or use a hot tub until your  health care provider approves. Ask your health care provider if you can take showers. You may only be allowed to take sponge baths for bathing.  · If your health care provider approves bathing and showering, cover the splint with a watertight plastic bag to protect it from water. Do not let the splint get wet. If you were given a removable splint to wear, only remove it for bathing as directed by your health care provider.  · Keep the bandage (dressing) dry until your health care provider says it can be removed.  Managing Pain, Stiffness, and Swelling  · Move your fingers often to avoid stiffness and to lessen swelling.  · Raise the injured area above the level of your heart while you are sitting or lying down.  · If directed, apply ice to the incision:  ¨ Put ice in a plastic bag.  ¨ Place a towel between your skin and the bag.  ¨ Leave the ice on for 20 minutes, 2-3 times per day.  Driving  · Do not drive or operate heavy machinery while taking pain medicine.  · Do not drive while wearing a splint on a hand that you use for driving.  Activity  · Return to your normal activities as directed by your health care provider. Ask your health care provider what activities are safe for you.  · Perform range-of-motion exercises only as directed by your health care provider.  Incision Care  · There are many different ways to close and cover an incision, including stitches (sutures), staples, and adhesive strips. Follow instructions from your health care provider about:  ¨ Incision care.  ¨ Dressing changes and removal.  ¨ Incision closure removal.  · Check your incision area every day for signs of infection. Watch for:  ¨ Redness, swelling, or pain.  ¨ Fluid, blood, or pus.  General Instructions  · Do not put pressure on any part of the splint until it is fully hardened. This may take several hours.  · If you were given a sling, wear it as directed by your health care provider.  · Keep the splint clean and dry.  · Do not use  any tobacco products, including cigarettes, chewing tobacco, or electronic cigarettes. Tobacco can delay bone healing. If you need help quitting, ask your health care provider.  · Take medicines only as directed by your health care provider.  · Keep all follow-up visits as directed by your health care provider. This is important.  · Do not use the injured limb to support your body weight until your health care provider says that you can.  SEEK MEDICAL CARE IF:  · You have redness, swelling, or pain at the site of your incision.  · You have fluid, blood, or pus coming from your incision.  · You have a fever or chills.  · You notice a bad smell coming from your incision or your dressing.  · The edges of your incision break open after the sutures or staples have been removed.  SEEK IMMEDIATE MEDICAL CARE IF:  · You develop a rash.  · You have difficulty breathing.  · You have numbness or tingling in your hand or forearm.     This information is not intended to replace advice given to you by your health care provider. Make sure you discuss any questions you have with your health care provider.     Document Released: 07/07/2006 Document Revised: 05/03/2016 Document Reviewed: 12/14/2015  [employer] Interactive Patient Education ©2016 [employer]      Incision Care  An incision is when a surgeon cuts into your body. After surgery, the incision needs to be cared for properly to prevent infection.   HOW TO CARE FOR YOUR INCISION  · Take medicines only as directed by your health care provider.  · There are many different ways to close and cover an incision, including stitches, skin glue, and adhesive strips. Follow your health care provider's instructions on:  ¨ Incision care.  ¨ Bandage (dressing) changes and removal.  ¨ Incision closure removal.  · Do not take baths, swim, or use a hot tub until your health care provider approves. You may shower as directed by your health care provider.  · Resume your normal diet and  activities as directed.  · Use anti-itch medicine (such as an antihistamine) as directed by your health care provider. The incision may itch while it is healing. Do not pick or scratch at the incision.  · Drink enough fluid to keep your urine clear or pale yellow.  SEEK MEDICAL CARE IF:   · You have drainage, redness, swelling, or pain at your incision site.  · You have muscle aches, chills, or a general ill feeling.  · You notice a bad smell coming from the incision or dressing.  · Your incision edges separate after the sutures, staples, or skin adhesive strips have been removed.  · You have persistent nausea or vomiting.  · You have a fever.  · You are dizzy.  SEEK IMMEDIATE MEDICAL CARE IF:   · You have a rash.  · You faint.  · You have difficulty breathing.  MAKE SURE YOU:   · Understand these instructions.  · Will watch your condition.  · Will get help right away if you are not doing well or get worse.     This information is not intended to replace advice given to you by your health care provider. Make sure you discuss any questions you have with your health care provider.     Document Released: 07/07/2006 Document Revised: 01/08/2016 Document Reviewed: 02/11/2015  [employer] Interactive Patient Education ©2016 [employer]

## 2018-02-08 NOTE — THERAPY
"Physical Therapy Evaluation completed.   Bed Mobility:  Supine to Sit: Modified Independent  Transfers: Sit to Stand: Modified Independent  Gait: Level Of Assist: Modified Independent with No Equipment Needed       Plan of Care: Patient with no further skilled PT needs in the acute care setting at this time  Discharge Recommendations: Equipment: No Equipment Needed. Post-acute therapy Discharge to home with outpatient or home health for additional skilled therapy services.    Mr. Ortega is a 51 y/o male who presents to acute secondary to fall down stairs at work which resulted in R comminuted olecranon fracture that is s/p ORIF. He presents with lower extremity strength that is equal bilaterally and WFL. No sensation deficits. Pt was able to perform gait, transfers, bed mobility, and stairs without physical assist. No additional acute physical therpay needs at this time. Recommend outpatient physical therapy once cleared by MD for ROM of elbow.    See \"Rehab Therapy-Acute\" Patient Summary Report for complete documentation.     "

## 2018-02-08 NOTE — PROGRESS NOTES
Pt arrived on the unit on a gurney with transport.  AOx4, on 2LPM O2, VSS, no pain concern.  RUE on splint, sling applied by traction. numbness noted but is able to move fingers.  Able to mobilize to transfer to bed, steady on feet.  POC discussed to include safety, call light use and pain.  Admission completed.    Skin assessment performed with Heather DARDEN. Skin appears to be intact.

## 2018-02-08 NOTE — PROGRESS NOTES
Pt discharged Home, escorted out by nursing. All personal belongings sent with the patient upon discharge. Discharge instructions and prescriptions explained with verbal understanding provided by the patient. All needs met and all questions answered. PIV removed and documented.

## 2018-02-08 NOTE — FLOWSHEET NOTE
Respiratory Therapy Update                                                   O2 (LPM): 0 (02/08/18 5887)                Events/Summary/Plan: IS not done.  Pt. out of room.  Checked x3. (02/08/18 9992)

## 2018-02-08 NOTE — DISCHARGE SUMMARY
DISCHARGE SUMMARY    PATIENTS NAME: Kg Ortega    MRN: [id number]    CSN: [id number]    ADMIT DATE:  2/7/2018    DISCHARGE DATE: 2/8/2018    ADMIT MD: Jose Zuniga M.D.    DISCHARGE MD: Jose Zuniga M.D.    REASON FOR ADMIT:fall with elbow pain and deformity    PRINCIPLE DIAGNOSIS:Right comminuted olecranon fracture    SECONDARY DIAGNOSIS:none    PROCEDURES: 2/7/18  Jose Zuniga M.D.Open reduction and internal fixation, right comminuted olecranon fracture.    CONSULTATIONS: Jose Zuniga M.D.     HOSPITAL COURSE: Patient is a 52-year-old gentleman status post fall during which he sustained a comminuted right olecranon fracture.  He was initially seen by Dr Ernesto Cho in the [facility] ER.  Dr Jose Zuniga MD was consulted for orthopaedics.   He felt that the nature of the patients fractures necessitated surgical intervention.  After explaining the indications, risks, benefits, and alternatives the patient wished to proceed with surgical intervention.  The patient was taken to the OR for the above mentioned procedure.  He had no complications and minimal blood loss. He has done well with mobilization and his pain has been well controlled with oral medications. He is now ready for DC at this time.     DISCHARGE LOCATION:home    DVT PROPHYLAXSIS:ambulatory    ANTIBIOTICS:completed    WEIGHT BEARING:non weight bearing right arm    FOLLOW UP: 10-14 days post operatively with Dr Jose Zuniga M.D.    DISCHARGE DIAGNOSIS: Status post open reduction and internal fixation, right comminuted olecranon fracture.    MEDICATIONS:   Current Outpatient Prescriptions   Medication Sig Dispense Refill   • docusate sodium (COLACE) 100 MG Cap Take 1 Cap by mouth 2 times a day. 60 Cap 0   • docusate sodium 100 MG Cap Take 100 mg by mouth 2 times a day. 60 Cap 0   • HYDROcodone-acetaminophen (NORCO) 5-325 MG Tab per tablet Take 1-2 Tabs by mouth every four hours as needed for up to 30 days. 60 Tab 0

## 2018-02-08 NOTE — CARE PLAN
Problem: Safety  Goal: Will remain free from injury  Outcome: PROGRESSING AS EXPECTED  Provided assistance with mobility. Fall prevention measures in place. rounds ongoing.    Problem: Infection  Goal: Will remain free from infection  Outcome: PROGRESSING AS EXPECTED  Assessed for signs of infection. Standard precautions implemented.

## 2018-02-08 NOTE — CARE PLAN
Problem: Communication  Goal: The ability to communicate needs accurately and effectively will improve    Intervention: Educate patient and significant other/support system about the plan of care, procedures, treatments, medications and allow for questions  White board updated with day staff and return time.  PT notified of hourly rounding and unit routine.   Appropriate signs in place at doorway for pt.       Problem: Safety  Goal: Will remain free from falls    Intervention: Implement fall precautions  Pt calls appropriately, treaded socks in use. Personal belongings and call light with in reach and bed is locked in lowest position.      Problem: Discharge Barriers/Planning  Goal: Patient's continuum of care needs will be met  Outcome: PROGRESSING AS EXPECTED  Patient cleared to dc. PT/OT cleared as does Ortho.

## 2018-02-08 NOTE — OP REPORT
DATE OF SERVICE:  02/07/2018    PREOPERATIVE DIAGNOSIS:  Right comminuted olecranon fracture.    POSTOPERATIVE DIAGNOSES:  Right comminuted olecranon fracture.    PROCEDURE:  Open reduction and internal fixation, right comminuted olecranon   fracture.    SURGEON:  Jose Quintero MD    ASSISTANT:  Cr Romero MD    ESTIMATED BLOOD LOSS:  Minimal.    INDICATIONS:  This is a 52-year-old gentleman status post fall during which he   sustained a comminuted right olecranon fracture.  Risks and benefits of open   reduction internal fixation were discussed at length, which include but not   limited to bleeding, infection, neurovascular damage, pain, stiffness,   malunion, nonunion, DVT, PE, MI, stroke, and death.  He understands all these   risks and wishes to proceed.    DESCRIPTION OF PROCEDURE:  The patient was sedated with LMA anesthesia and   administered proper antibiotics.  He was placed in lateral position on a   beanbag with care taken to pad all bony prominences.  His right upper   extremity was prepped in usual sterile fashion.  Standard posterior approach   to the elbow was performed with care taken to avoid neurovascular structures.    His joint surface and comminution was elevated and reduced to anatomic   position and held with a [employer] olecranon plate with a combination   of locking and nonlocking fixation.  All screws were checked and found to be   of appropriate length and out of the joint.  Reduction was found to be   anatomic.  Wounds were irrigated, closed with 0 Vicryl, 2-0 Vicryl suture, and   staples.  Sterile dressing was applied.  Posterior splint was applied.    Patient tolerated the procedure well.    POSTOPERATIVE PLAN:  The patient to be admitted overnight for observation and   discharge home in the morning.       ____________________________________     JOSE QUINTERO MD    GIULIANA / NTS    DD:  02/07/2018 22:13:26  DT:  02/07/2018 22:45:13    D#:  [id number]  Job#:  [id number]

## 2018-02-09 NOTE — THERAPY
"Occupational Therapy Evaluation completed.   Functional Status:  Supervision supine to sit.  Pt able to complete UB dressing/sling management with SBA.  Pt donned pants with SBA.  Pt walked unit without AD.  Pt able to self-feed mod independent.  Pt eager to DC home and reports he will have support for IADL's from neighbors.  Plan of Care: Patient with no further skilled OT needs in the acute care setting at this time  Discharge Recommendations:  Equipment: No Equipment Needed. Post-acute therapy Discharge to home with outpatient or home health for additional skilled therapy services.    See \"Rehab Therapy-Acute\" Patient Summary Report for complete documentation.    "

## 2018-06-21 ENCOUNTER — HOSPITAL ENCOUNTER (OUTPATIENT)
Dept: LAB | Facility: MEDICAL CENTER | Age: 53
End: 2018-06-21
Attending: FAMILY MEDICINE
Payer: COMMERCIAL

## 2018-06-21 LAB
ALBUMIN SERPL BCP-MCNC: 4.5 G/DL (ref 3.2–4.9)
ALBUMIN/GLOB SERPL: 1.5 G/DL
ALP SERPL-CCNC: 71 U/L (ref 30–99)
ALT SERPL-CCNC: 20 U/L (ref 2–50)
ANION GAP SERPL CALC-SCNC: 7 MMOL/L (ref 0–11.9)
AST SERPL-CCNC: 21 U/L (ref 12–45)
BASOPHILS # BLD AUTO: 0.6 % (ref 0–1.8)
BASOPHILS # BLD: 0.03 K/UL (ref 0–0.12)
BILIRUB SERPL-MCNC: 0.6 MG/DL (ref 0.1–1.5)
BUN SERPL-MCNC: 21 MG/DL (ref 8–22)
CALCIUM SERPL-MCNC: 9.6 MG/DL (ref 8.5–10.5)
CHLORIDE SERPL-SCNC: 104 MMOL/L (ref 96–112)
CHOLEST SERPL-MCNC: 214 MG/DL (ref 100–199)
CO2 SERPL-SCNC: 28 MMOL/L (ref 20–33)
CREAT SERPL-MCNC: 1.09 MG/DL (ref 0.5–1.4)
EOSINOPHIL # BLD AUTO: 0.09 K/UL (ref 0–0.51)
EOSINOPHIL NFR BLD: 1.7 % (ref 0–6.9)
ERYTHROCYTE [DISTWIDTH] IN BLOOD BY AUTOMATED COUNT: 43.6 FL (ref 35.9–50)
EST. AVERAGE GLUCOSE BLD GHB EST-MCNC: 123 MG/DL
GLOBULIN SER CALC-MCNC: 3 G/DL (ref 1.9–3.5)
GLUCOSE SERPL-MCNC: 98 MG/DL (ref 65–99)
HBA1C MFR BLD: 5.9 % (ref 0–5.6)
HCT VFR BLD AUTO: 46 % (ref 42–52)
HDLC SERPL-MCNC: 66 MG/DL
HGB BLD-MCNC: 15.5 G/DL (ref 14–18)
IMM GRANULOCYTES # BLD AUTO: 0.02 K/UL (ref 0–0.11)
IMM GRANULOCYTES NFR BLD AUTO: 0.4 % (ref 0–0.9)
LDLC SERPL CALC-MCNC: 132 MG/DL
LYMPHOCYTES # BLD AUTO: 1.58 K/UL (ref 1–4.8)
LYMPHOCYTES NFR BLD: 29.9 % (ref 22–41)
MCH RBC QN AUTO: 30.3 PG (ref 27–33)
MCHC RBC AUTO-ENTMCNC: 33.7 G/DL (ref 33.7–35.3)
MCV RBC AUTO: 89.8 FL (ref 81.4–97.8)
MONOCYTES # BLD AUTO: 0.49 K/UL (ref 0–0.85)
MONOCYTES NFR BLD AUTO: 9.3 % (ref 0–13.4)
NEUTROPHILS # BLD AUTO: 3.08 K/UL (ref 1.82–7.42)
NEUTROPHILS NFR BLD: 58.1 % (ref 44–72)
NRBC # BLD AUTO: 0 K/UL
NRBC BLD-RTO: 0 /100 WBC
PLATELET # BLD AUTO: 233 K/UL (ref 164–446)
PMV BLD AUTO: 10.4 FL (ref 9–12.9)
POTASSIUM SERPL-SCNC: 4.4 MMOL/L (ref 3.6–5.5)
PROT SERPL-MCNC: 7.5 G/DL (ref 6–8.2)
RBC # BLD AUTO: 5.12 M/UL (ref 4.7–6.1)
SODIUM SERPL-SCNC: 139 MMOL/L (ref 135–145)
T4 FREE SERPL-MCNC: 0.81 NG/DL (ref 0.53–1.43)
TRIGL SERPL-MCNC: 80 MG/DL (ref 0–149)
TSH SERPL DL<=0.005 MIU/L-ACNC: 7.69 UIU/ML (ref 0.38–5.33)
WBC # BLD AUTO: 5.3 K/UL (ref 4.8–10.8)

## 2018-06-21 PROCEDURE — 84443 ASSAY THYROID STIM HORMONE: CPT

## 2018-06-21 PROCEDURE — 80061 LIPID PANEL: CPT

## 2018-06-21 PROCEDURE — 83036 HEMOGLOBIN GLYCOSYLATED A1C: CPT

## 2018-06-21 PROCEDURE — 84439 ASSAY OF FREE THYROXINE: CPT

## 2018-06-21 PROCEDURE — 80053 COMPREHEN METABOLIC PANEL: CPT

## 2018-06-21 PROCEDURE — 85025 COMPLETE CBC W/AUTO DIFF WBC: CPT

## 2018-06-21 PROCEDURE — 36415 COLL VENOUS BLD VENIPUNCTURE: CPT

## 2018-09-20 ENCOUNTER — HOSPITAL ENCOUNTER (OUTPATIENT)
Dept: LAB | Facility: MEDICAL CENTER | Age: 53
End: 2018-09-20
Attending: FAMILY MEDICINE
Payer: COMMERCIAL

## 2018-09-20 LAB
ALBUMIN SERPL BCP-MCNC: 4.2 G/DL (ref 3.2–4.9)
ALBUMIN/GLOB SERPL: 1.4 G/DL
ALP SERPL-CCNC: 62 U/L (ref 30–99)
ALT SERPL-CCNC: 19 U/L (ref 2–50)
ANION GAP SERPL CALC-SCNC: 6 MMOL/L (ref 0–11.9)
AST SERPL-CCNC: 18 U/L (ref 12–45)
BILIRUB SERPL-MCNC: 0.7 MG/DL (ref 0.1–1.5)
BUN SERPL-MCNC: 21 MG/DL (ref 8–22)
CALCIUM SERPL-MCNC: 9.3 MG/DL (ref 8.5–10.5)
CHLORIDE SERPL-SCNC: 107 MMOL/L (ref 96–112)
CHOLEST SERPL-MCNC: 160 MG/DL (ref 100–199)
CO2 SERPL-SCNC: 28 MMOL/L (ref 20–33)
CREAT SERPL-MCNC: 0.87 MG/DL (ref 0.5–1.4)
EST. AVERAGE GLUCOSE BLD GHB EST-MCNC: 120 MG/DL
FASTING STATUS PATIENT QL REPORTED: NORMAL
GLOBULIN SER CALC-MCNC: 2.9 G/DL (ref 1.9–3.5)
GLUCOSE SERPL-MCNC: 99 MG/DL (ref 65–99)
HBA1C MFR BLD: 5.8 % (ref 0–5.6)
HDLC SERPL-MCNC: 66 MG/DL
LDLC SERPL CALC-MCNC: 82 MG/DL
POTASSIUM SERPL-SCNC: 4.1 MMOL/L (ref 3.6–5.5)
PROT SERPL-MCNC: 7.1 G/DL (ref 6–8.2)
SODIUM SERPL-SCNC: 141 MMOL/L (ref 135–145)
TRIGL SERPL-MCNC: 61 MG/DL (ref 0–149)
TSH SERPL DL<=0.005 MIU/L-ACNC: 2.48 UIU/ML (ref 0.38–5.33)

## 2018-09-20 PROCEDURE — 84443 ASSAY THYROID STIM HORMONE: CPT

## 2018-09-20 PROCEDURE — 80053 COMPREHEN METABOLIC PANEL: CPT

## 2018-09-20 PROCEDURE — 36415 COLL VENOUS BLD VENIPUNCTURE: CPT

## 2018-09-20 PROCEDURE — 80061 LIPID PANEL: CPT

## 2018-09-20 PROCEDURE — 83036 HEMOGLOBIN GLYCOSYLATED A1C: CPT

## 2018-12-31 ENCOUNTER — HOSPITAL ENCOUNTER (OUTPATIENT)
Dept: LAB | Facility: MEDICAL CENTER | Age: 53
End: 2018-12-31
Attending: FAMILY MEDICINE
Payer: COMMERCIAL

## 2018-12-31 LAB
ALBUMIN SERPL BCP-MCNC: 4.4 G/DL (ref 3.2–4.9)
ALBUMIN/GLOB SERPL: 1.5 G/DL
ALP SERPL-CCNC: 64 U/L (ref 30–99)
ALT SERPL-CCNC: 15 U/L (ref 2–50)
ANION GAP SERPL CALC-SCNC: 6 MMOL/L (ref 0–11.9)
AST SERPL-CCNC: 14 U/L (ref 12–45)
BILIRUB SERPL-MCNC: 0.7 MG/DL (ref 0.1–1.5)
BUN SERPL-MCNC: 29 MG/DL (ref 8–22)
CALCIUM SERPL-MCNC: 9.2 MG/DL (ref 8.5–10.5)
CHLORIDE SERPL-SCNC: 107 MMOL/L (ref 96–112)
CHOLEST SERPL-MCNC: 184 MG/DL (ref 100–199)
CO2 SERPL-SCNC: 27 MMOL/L (ref 20–33)
CREAT SERPL-MCNC: 0.98 MG/DL (ref 0.5–1.4)
EST. AVERAGE GLUCOSE BLD GHB EST-MCNC: 126 MG/DL
FASTING STATUS PATIENT QL REPORTED: NORMAL
GLOBULIN SER CALC-MCNC: 3 G/DL (ref 1.9–3.5)
GLUCOSE SERPL-MCNC: 102 MG/DL (ref 65–99)
HBA1C MFR BLD: 6 % (ref 0–5.6)
HDLC SERPL-MCNC: 65 MG/DL
LDLC SERPL CALC-MCNC: 103 MG/DL
POTASSIUM SERPL-SCNC: 4.4 MMOL/L (ref 3.6–5.5)
PROT SERPL-MCNC: 7.4 G/DL (ref 6–8.2)
PSA SERPL-MCNC: 0.94 NG/ML (ref 0–4)
SODIUM SERPL-SCNC: 140 MMOL/L (ref 135–145)
TRIGL SERPL-MCNC: 80 MG/DL (ref 0–149)

## 2018-12-31 PROCEDURE — 80061 LIPID PANEL: CPT

## 2018-12-31 PROCEDURE — 83036 HEMOGLOBIN GLYCOSYLATED A1C: CPT

## 2018-12-31 PROCEDURE — 84153 ASSAY OF PSA TOTAL: CPT

## 2018-12-31 PROCEDURE — 80053 COMPREHEN METABOLIC PANEL: CPT

## 2018-12-31 PROCEDURE — 36415 COLL VENOUS BLD VENIPUNCTURE: CPT

## 2019-03-18 ENCOUNTER — HOSPITAL ENCOUNTER (OUTPATIENT)
Dept: LAB | Facility: MEDICAL CENTER | Age: 54
End: 2019-03-18
Attending: FAMILY MEDICINE
Payer: COMMERCIAL

## 2019-03-18 LAB
ALBUMIN SERPL BCP-MCNC: 4.2 G/DL (ref 3.2–4.9)
ALBUMIN/GLOB SERPL: 1.5 G/DL
ALP SERPL-CCNC: 67 U/L (ref 30–99)
ALT SERPL-CCNC: 17 U/L (ref 2–50)
ANION GAP SERPL CALC-SCNC: 4 MMOL/L (ref 0–11.9)
AST SERPL-CCNC: 15 U/L (ref 12–45)
BILIRUB SERPL-MCNC: 0.4 MG/DL (ref 0.1–1.5)
BUN SERPL-MCNC: 24 MG/DL (ref 8–22)
CALCIUM SERPL-MCNC: 8.9 MG/DL (ref 8.5–10.5)
CHLORIDE SERPL-SCNC: 107 MMOL/L (ref 96–112)
CHOLEST SERPL-MCNC: 148 MG/DL (ref 100–199)
CO2 SERPL-SCNC: 28 MMOL/L (ref 20–33)
CREAT SERPL-MCNC: 1.1 MG/DL (ref 0.5–1.4)
EST. AVERAGE GLUCOSE BLD GHB EST-MCNC: 123 MG/DL
FASTING STATUS PATIENT QL REPORTED: NORMAL
GLOBULIN SER CALC-MCNC: 2.8 G/DL (ref 1.9–3.5)
GLUCOSE SERPL-MCNC: 111 MG/DL (ref 65–99)
HBA1C MFR BLD: 5.9 % (ref 0–5.6)
HDLC SERPL-MCNC: 55 MG/DL
LDLC SERPL CALC-MCNC: 75 MG/DL
POTASSIUM SERPL-SCNC: 4.8 MMOL/L (ref 3.6–5.5)
PROT SERPL-MCNC: 7 G/DL (ref 6–8.2)
SODIUM SERPL-SCNC: 139 MMOL/L (ref 135–145)
TRIGL SERPL-MCNC: 88 MG/DL (ref 0–149)

## 2019-03-18 PROCEDURE — 36415 COLL VENOUS BLD VENIPUNCTURE: CPT

## 2019-03-18 PROCEDURE — 83036 HEMOGLOBIN GLYCOSYLATED A1C: CPT

## 2019-03-18 PROCEDURE — 80053 COMPREHEN METABOLIC PANEL: CPT

## 2019-03-18 PROCEDURE — 80061 LIPID PANEL: CPT

## 2019-05-14 ENCOUNTER — HOSPITAL ENCOUNTER (OUTPATIENT)
Dept: RADIOLOGY | Facility: MEDICAL CENTER | Age: 54
End: 2019-05-14
Attending: FAMILY MEDICINE
Payer: COMMERCIAL

## 2019-05-14 DIAGNOSIS — J32.9 CHRONIC SINUSITIS, UNSPECIFIED LOCATION: ICD-10-CM

## 2019-05-14 PROCEDURE — 70486 CT MAXILLOFACIAL W/O DYE: CPT

## 2019-07-03 ENCOUNTER — HOSPITAL ENCOUNTER (EMERGENCY)
Facility: MEDICAL CENTER | Age: 54
End: 2019-07-03
Attending: EMERGENCY MEDICINE
Payer: COMMERCIAL

## 2019-07-03 ENCOUNTER — APPOINTMENT (OUTPATIENT)
Dept: RADIOLOGY | Facility: MEDICAL CENTER | Age: 54
End: 2019-07-03
Attending: EMERGENCY MEDICINE
Payer: COMMERCIAL

## 2019-07-03 VITALS
TEMPERATURE: 98.4 F | SYSTOLIC BLOOD PRESSURE: 121 MMHG | HEIGHT: 70 IN | RESPIRATION RATE: 18 BRPM | DIASTOLIC BLOOD PRESSURE: 66 MMHG | WEIGHT: 233.69 LBS | HEART RATE: 62 BPM | OXYGEN SATURATION: 94 % | BODY MASS INDEX: 33.46 KG/M2

## 2019-07-03 DIAGNOSIS — R06.02 SHORTNESS OF BREATH: ICD-10-CM

## 2019-07-03 DIAGNOSIS — J45.41 MODERATE PERSISTENT REACTIVE AIRWAY DISEASE WITH WHEEZING WITH ACUTE EXACERBATION: ICD-10-CM

## 2019-07-03 LAB
ALBUMIN SERPL BCP-MCNC: 3.9 G/DL (ref 3.2–4.9)
ALBUMIN/GLOB SERPL: 1.1 G/DL
ALP SERPL-CCNC: 48 U/L (ref 30–99)
ALT SERPL-CCNC: 20 U/L (ref 2–50)
ANION GAP SERPL CALC-SCNC: 11 MMOL/L (ref 0–11.9)
AST SERPL-CCNC: 21 U/L (ref 12–45)
BASOPHILS # BLD AUTO: 0.4 % (ref 0–1.8)
BASOPHILS # BLD: 0.03 K/UL (ref 0–0.12)
BILIRUB SERPL-MCNC: 0.8 MG/DL (ref 0.1–1.5)
BUN SERPL-MCNC: 26 MG/DL (ref 8–22)
CALCIUM SERPL-MCNC: 8.8 MG/DL (ref 8.4–10.2)
CHLORIDE SERPL-SCNC: 98 MMOL/L (ref 96–112)
CO2 SERPL-SCNC: 27 MMOL/L (ref 20–33)
CREAT SERPL-MCNC: 1.17 MG/DL (ref 0.5–1.4)
D DIMER PPP IA.FEU-MCNC: <0.4 UG/ML (FEU) (ref 0–0.5)
EOSINOPHIL # BLD AUTO: 0.07 K/UL (ref 0–0.51)
EOSINOPHIL NFR BLD: 1 % (ref 0–6.9)
ERYTHROCYTE [DISTWIDTH] IN BLOOD BY AUTOMATED COUNT: 40.3 FL (ref 35.9–50)
GLOBULIN SER CALC-MCNC: 3.7 G/DL (ref 1.9–3.5)
GLUCOSE SERPL-MCNC: 107 MG/DL (ref 65–99)
HCT VFR BLD AUTO: 42.4 % (ref 42–52)
HGB BLD-MCNC: 14.2 G/DL (ref 14–18)
IMM GRANULOCYTES # BLD AUTO: 0.09 K/UL (ref 0–0.11)
IMM GRANULOCYTES NFR BLD AUTO: 1.2 % (ref 0–0.9)
LYMPHOCYTES # BLD AUTO: 1.77 K/UL (ref 1–4.8)
LYMPHOCYTES NFR BLD: 24.2 % (ref 22–41)
MCH RBC QN AUTO: 30.5 PG (ref 27–33)
MCHC RBC AUTO-ENTMCNC: 33.5 G/DL (ref 33.7–35.3)
MCV RBC AUTO: 91.2 FL (ref 81.4–97.8)
MONOCYTES # BLD AUTO: 0.73 K/UL (ref 0–0.85)
MONOCYTES NFR BLD AUTO: 10 % (ref 0–13.4)
NEUTROPHILS # BLD AUTO: 4.63 K/UL (ref 1.82–7.42)
NEUTROPHILS NFR BLD: 63.2 % (ref 44–72)
NRBC # BLD AUTO: 0 K/UL
NRBC BLD-RTO: 0 /100 WBC
PLATELET # BLD AUTO: 311 K/UL (ref 164–446)
PMV BLD AUTO: 9.5 FL (ref 9–12.9)
POTASSIUM SERPL-SCNC: 4.2 MMOL/L (ref 3.6–5.5)
PROT SERPL-MCNC: 7.6 G/DL (ref 6–8.2)
RBC # BLD AUTO: 4.65 M/UL (ref 4.7–6.1)
SODIUM SERPL-SCNC: 136 MMOL/L (ref 135–145)
TROPONIN I SERPL-MCNC: <0.02 NG/ML (ref 0–0.04)
WBC # BLD AUTO: 7.3 K/UL (ref 4.8–10.8)

## 2019-07-03 PROCEDURE — 700111 HCHG RX REV CODE 636 W/ 250 OVERRIDE (IP): Performed by: EMERGENCY MEDICINE

## 2019-07-03 PROCEDURE — 84484 ASSAY OF TROPONIN QUANT: CPT

## 2019-07-03 PROCEDURE — 700101 HCHG RX REV CODE 250: Performed by: EMERGENCY MEDICINE

## 2019-07-03 PROCEDURE — 71046 X-RAY EXAM CHEST 2 VIEWS: CPT

## 2019-07-03 PROCEDURE — 80053 COMPREHEN METABOLIC PANEL: CPT

## 2019-07-03 PROCEDURE — 304561 HCHG STAT O2

## 2019-07-03 PROCEDURE — 99284 EMERGENCY DEPT VISIT MOD MDM: CPT

## 2019-07-03 PROCEDURE — 94640 AIRWAY INHALATION TREATMENT: CPT

## 2019-07-03 PROCEDURE — 94760 N-INVAS EAR/PLS OXIMETRY 1: CPT

## 2019-07-03 PROCEDURE — 85025 COMPLETE CBC W/AUTO DIFF WBC: CPT

## 2019-07-03 PROCEDURE — 85379 FIBRIN DEGRADATION QUANT: CPT

## 2019-07-03 RX ORDER — IPRATROPIUM BROMIDE AND ALBUTEROL SULFATE 2.5; .5 MG/3ML; MG/3ML
3 SOLUTION RESPIRATORY (INHALATION)
Status: COMPLETED | OUTPATIENT
Start: 2019-07-03 | End: 2019-07-03

## 2019-07-03 RX ORDER — PREDNISONE 20 MG/1
60 TABLET ORAL DAILY
Qty: 12 TAB | Refills: 0 | Status: SHIPPED | OUTPATIENT
Start: 2019-07-03 | End: 2019-07-07

## 2019-07-03 RX ORDER — ALBUTEROL SULFATE 90 UG/1
2 AEROSOL, METERED RESPIRATORY (INHALATION) EVERY 6 HOURS PRN
Qty: 8.5 G | Refills: 0 | Status: SHIPPED | OUTPATIENT
Start: 2019-07-03 | End: 2023-07-24

## 2019-07-03 RX ADMIN — PREDNISONE 60 MG: 10 TABLET ORAL at 16:22

## 2019-07-03 RX ADMIN — IPRATROPIUM BROMIDE AND ALBUTEROL SULFATE 3 ML: .5; 3 SOLUTION RESPIRATORY (INHALATION) at 14:25

## 2019-07-03 ASSESSMENT — LIFESTYLE VARIABLES: EVER_SMOKED: NEVER

## 2019-07-03 ASSESSMENT — PAIN SCALES - WONG BAKER: WONGBAKER_NUMERICALRESPONSE: HURTS A LITTLE MORE

## 2019-07-03 NOTE — FLOWSHEET NOTE
Nebulizer given in ER.  Mild rhonchi mostly clearing after treatment and productive cough of white secretions.     07/03/19 1425   RT Assessment of Delivered Medications   Evaluation of Medication Delivery Daily Yes-- Pt /Family has been Instructed in use of Respiratory Medications and Adverse Reactions   SVN Group   #SVN Performed Yes   Given By: Mouthpiece   Chest Exam   Respiration 18   Pulse 68   Heart Rate (Monitored) 64   Breath Sounds   RUL Breath Sounds Rhonchi   RML Breath Sounds Rhonchi   RLL Breath Sounds Diminished;Rhonchi   MAGDALENA Breath Sounds Rhonchi   LLL Breath Sounds Diminished;Rhonchi   Secretions   Cough Congested;Moist;Productive   Sputum Amount Moderate   Sputum Color White;Clear   Oximetry   Continuous Oximetry Yes   Oxygen   Home O2 Use Prior To Admission? No   Pulse Oximetry 91 %   O2 (LPM) 3   O2 Daily Delivery Respiratory  Silicone Nasal Cannula

## 2019-07-03 NOTE — ED TRIAGE NOTES
Pt amb to triage c/o sob, dry cough and headache x1wk s/p inhalation of insect repellent while camping >1wk ago. Pt has remote hx pnx. RA 89-91%; pt placed on o2 2L via nc :94% in triage

## 2019-07-03 NOTE — ED PROVIDER NOTES
ED Provider Note    CHIEF COMPLAINT  Chief Complaint   Patient presents with   • Shortness of Breath   • Headache       HPI  Kg Ortega is a 54 y.o. male who presents for evaluation of shortness of breath and a cough over the past 10 days, states he gets pneumonia often and suspects he has pneumonia now.  He thinks is all began after being exposed to some bug spray while camping, although he does not report immediate onset of symptoms after that.  He has tightness in his chest but denies pain in his chest, no abdominal pain acutely and no vomiting.  He has no unilateral leg pain or swelling and no history of DVT or PE.  He states is a history of hypertension and hypothyroidism, no known coronary disease.    REVIEW OF SYSTEMS  Negative for fever, rash, abdominal pain, nausea, vomiting, diarrhea, headache, focal weakness, focal numbness, focal tingling, back pain. All other systems are negative.     PAST MEDICAL HISTORY  Past Medical History:   Diagnosis Date   • HTN (hypertension) 1/3/2012   • Hypothyroidism    • Monocytosis    • Neuropathic pain 1/3/2012   • Vitamin D deficiency        FAMILY HISTORY  Family History   Problem Relation Age of Onset   • Cancer Mother         lung   • Hypertension Father    • Hypertension Brother    • Cancer Maternal Grandfather    • Heart Disease Paternal Grandfather        SOCIAL HISTORY  Social History   Substance Use Topics   • Smoking status: Never Smoker   • Smokeless tobacco: Never Used   • Alcohol use No      Comment: rare       SURGICAL HISTORY  Past Surgical History:   Procedure Laterality Date   • ELBOW ORIF Right 2/7/2018    Procedure: ELBOW ORIF;  Surgeon: Jose Zuniga M.D.;  Location: SURGERY [facility];  Service: Orthopedics       CURRENT MEDICATIONS  I personally reviewed the medication list in the charting documentation.     ALLERGIES  No Known Allergies    MEDICAL RECORD  I have reviewed patient's medical record and pertinent results are  "listed above.      PHYSICAL EXAM  VITAL SIGNS: /85   Pulse 73   Temp 36.9 °C (98.4 °F) (Temporal)   Resp 20   Ht 1.778 m (5' 10\")   Wt 106 kg (233 lb 11 oz)   SpO2 90%   BMI 33.53 kg/m²    Constitutional: Well appearing patient in no acute distress.  Not toxic, nor ill in appearance.  HENT: Mucus membranes moist.    Eyes: No scleral icterus. Normal conjunctiva   Neck: Supple, comfortable, nonpainful range of motion.   Cardiovascular: Regular heart rate and rhythm.   Thorax & Lungs: Chest is nontender.  Right greater than left expiratory wheezes, bibasilar crackles appreciated on exam.  Good air movement overall.  Abdomen: Soft, with no tenderness, rebound nor guarding.  No mass or pulsatile mass appreciated.  Skin: Warm, dry. No rash appreciated  Extremities/Musculoskeletal: No sign of trauma. No asymmetric calf tenderness, erythema or edema. Normal range of motion   Neurologic: Alert & oriented. No focal deficits observed.   Psychiatric: Normal affect appropriate for the clinical situation.    DIAGNOSTIC STUDIES / PROCEDURES    LABS/EKGs  Results for orders placed or performed during the hospital encounter of 07/03/19   CBC w/ Differential   Result Value Ref Range    WBC 7.3 4.8 - 10.8 K/uL    RBC 4.65 (L) 4.70 - 6.10 M/uL    Hemoglobin 14.2 14.0 - 18.0 g/dL    Hematocrit 42.4 42.0 - 52.0 %    MCV 91.2 81.4 - 97.8 fL    MCH 30.5 27.0 - 33.0 pg    MCHC 33.5 (L) 33.7 - 35.3 g/dL    RDW 40.3 35.9 - 50.0 fL    Platelet Count 311 164 - 446 K/uL    MPV 9.5 9.0 - 12.9 fL    Neutrophils-Polys 63.20 44.00 - 72.00 %    Lymphocytes 24.20 22.00 - 41.00 %    Monocytes 10.00 0.00 - 13.40 %    Eosinophils 1.00 0.00 - 6.90 %    Basophils 0.40 0.00 - 1.80 %    Immature Granulocytes 1.20 (H) 0.00 - 0.90 %    Nucleated RBC 0.00 /100 WBC    Neutrophils (Absolute) 4.63 1.82 - 7.42 K/uL    Lymphs (Absolute) 1.77 1.00 - 4.80 K/uL    Monos (Absolute) 0.73 0.00 - 0.85 K/uL    Eos (Absolute) 0.07 0.00 - 0.51 K/uL    Baso " (Absolute) 0.03 0.00 - 0.12 K/uL    Immature Granulocytes (abs) 0.09 0.00 - 0.11 K/uL    NRBC (Absolute) 0.00 K/uL   Complete Metabolic Panel (CMP)   Result Value Ref Range    Sodium 136 135 - 145 mmol/L    Potassium 4.2 3.6 - 5.5 mmol/L    Chloride 98 96 - 112 mmol/L    Co2 27 20 - 33 mmol/L    Anion Gap 11.0 0.0 - 11.9    Glucose 107 (H) 65 - 99 mg/dL    Bun 26 (H) 8 - 22 mg/dL    Creatinine 1.17 0.50 - 1.40 mg/dL    Calcium 8.8 8.4 - 10.2 mg/dL    AST(SGOT) 21 12 - 45 U/L    ALT(SGPT) 20 2 - 50 U/L    Alkaline Phosphatase 48 30 - 99 U/L    Total Bilirubin 0.8 0.1 - 1.5 mg/dL    Albumin 3.9 3.2 - 4.9 g/dL    Total Protein 7.6 6.0 - 8.2 g/dL    Globulin 3.7 (H) 1.9 - 3.5 g/dL    A-G Ratio 1.1 g/dL   Troponin STAT   Result Value Ref Range    Troponin I <0.02 0.00 - 0.04 ng/mL   ESTIMATED GFR   Result Value Ref Range    GFR If African American >60 >60 mL/min/1.73 m 2    GFR If Non African American >60 >60 mL/min/1.73 m 2   D-DIMER   Result Value Ref Range    D-Dimer Screen <0.40 0.00 - 0.50 ug/mL (FEU)        RADIOLOGY  DX-CHEST-2 VIEWS   Final Result         1. No pulmonary infiltrates or consolidations are noted.      2. Sequela of prior granulomatous disease.            COURSE & MEDICAL DECISION MAKING  I have reviewed any medical record information, laboratory studies and radiographic results as noted above.  Differential diagnoses includes: Pneumonia, anemia, ACS, pneumothorax, dehydration, electrolyte abnormalities    Encounter Summary: This is a 54 y.o. male with shortness of breath and cough over the past 10 days or so, says is consistent with frequent pneumonia that he gets on exam he is wheezing and has some bibasilar crackles and he presents hypoxic.  No evidence of DVT on exam.  I think pulmonary embolism is unlikely, will obtain an x-ray to evaluate for pneumonia.  In the absence of pneumonia rule out a pulmonary embolism will be pursued.  In the meantime we will check blood work, administer breathing  treatment and reevaluate ------ no evidence of pneumonia, d-dimer was then obtained to rule out pulmonary embolism and this is negative.  After the breathing treatment patient is feeling better and his hypoxia has resolved.  We will treat him with prednisone as well as an albuterol inhaler, strict return instructions have been discussed, he is stable and appropriate for discharge      DISPOSITION: Discharged home in stable condition      FINAL IMPRESSION  1. Moderate persistent reactive airway disease with wheezing with acute exacerbation    2. Shortness of breath           This dictation was created using voice recognition software. The accuracy of the dictation is limited to the abilities of the software. I expect there may be some errors of grammar and possibly content. The nursing notes were reviewed and certain aspects of this information were incorporated into this note.    Electronically signed by: Jefry Peters, 7/3/2019 2:17 PM

## 2019-07-03 NOTE — ED NOTES
1505 All results back, chart up for MD for re evaluation. poc update given to pt. No further questions at this time. Further orders and dispo pending  1520 new orders received , poc explained to pt. No further questions at this time

## 2019-07-03 NOTE — ED NOTES
"Pt is resting waiting for erp, refused a blanket, \"I like it cold\". He is on 3L O2 and sating at 96%.  "

## 2019-07-04 NOTE — ED NOTES
D/c pt home,2 rx given . Pt aware of f/u instructions , aware to return for any changes or concerns. No further questions upon d/c home from ed

## 2019-07-22 ENCOUNTER — HOSPITAL ENCOUNTER (OUTPATIENT)
Dept: LAB | Facility: MEDICAL CENTER | Age: 54
End: 2019-07-22
Attending: FAMILY MEDICINE
Payer: COMMERCIAL

## 2019-07-22 LAB
ALBUMIN SERPL BCP-MCNC: 4 G/DL (ref 3.2–4.9)
ALBUMIN/GLOB SERPL: 1.4 G/DL
ALP SERPL-CCNC: 54 U/L (ref 30–99)
ALT SERPL-CCNC: 13 U/L (ref 2–50)
ANION GAP SERPL CALC-SCNC: 8 MMOL/L (ref 0–11.9)
AST SERPL-CCNC: 11 U/L (ref 12–45)
BILIRUB SERPL-MCNC: 0.6 MG/DL (ref 0.1–1.5)
BUN SERPL-MCNC: 22 MG/DL (ref 8–22)
CALCIUM SERPL-MCNC: 9.1 MG/DL (ref 8.5–10.5)
CHLORIDE SERPL-SCNC: 104 MMOL/L (ref 96–112)
CHOLEST SERPL-MCNC: 200 MG/DL (ref 100–199)
CO2 SERPL-SCNC: 26 MMOL/L (ref 20–33)
CREAT SERPL-MCNC: 1.07 MG/DL (ref 0.5–1.4)
FASTING STATUS PATIENT QL REPORTED: NORMAL
GLOBULIN SER CALC-MCNC: 2.9 G/DL (ref 1.9–3.5)
GLUCOSE SERPL-MCNC: 106 MG/DL (ref 65–99)
HDLC SERPL-MCNC: 56 MG/DL
LDLC SERPL CALC-MCNC: 116 MG/DL
POTASSIUM SERPL-SCNC: 4.3 MMOL/L (ref 3.6–5.5)
PROT SERPL-MCNC: 6.9 G/DL (ref 6–8.2)
SODIUM SERPL-SCNC: 138 MMOL/L (ref 135–145)
TRIGL SERPL-MCNC: 138 MG/DL (ref 0–149)
TSH SERPL DL<=0.005 MIU/L-ACNC: 3.72 UIU/ML (ref 0.38–5.33)

## 2019-07-22 PROCEDURE — 80061 LIPID PANEL: CPT

## 2019-07-22 PROCEDURE — 80053 COMPREHEN METABOLIC PANEL: CPT

## 2019-07-22 PROCEDURE — 36415 COLL VENOUS BLD VENIPUNCTURE: CPT

## 2019-07-22 PROCEDURE — 84443 ASSAY THYROID STIM HORMONE: CPT

## 2020-06-08 ENCOUNTER — HOSPITAL ENCOUNTER (OUTPATIENT)
Dept: LAB | Facility: MEDICAL CENTER | Age: 55
End: 2020-06-08
Attending: PHYSICIAN ASSISTANT
Payer: COMMERCIAL

## 2020-06-08 LAB
ALBUMIN SERPL BCP-MCNC: 4.5 G/DL (ref 3.2–4.9)
ALBUMIN/GLOB SERPL: 1.6 G/DL
ALP SERPL-CCNC: 70 U/L (ref 30–99)
ALT SERPL-CCNC: 14 U/L (ref 2–50)
ANION GAP SERPL CALC-SCNC: 12 MMOL/L (ref 7–16)
AST SERPL-CCNC: 16 U/L (ref 12–45)
BILIRUB SERPL-MCNC: 0.4 MG/DL (ref 0.1–1.5)
BUN SERPL-MCNC: 18 MG/DL (ref 8–22)
CALCIUM SERPL-MCNC: 9.4 MG/DL (ref 8.5–10.5)
CHLORIDE SERPL-SCNC: 103 MMOL/L (ref 96–112)
CHOLEST SERPL-MCNC: 187 MG/DL (ref 100–199)
CO2 SERPL-SCNC: 24 MMOL/L (ref 20–33)
CREAT SERPL-MCNC: 1.01 MG/DL (ref 0.5–1.4)
EST. AVERAGE GLUCOSE BLD GHB EST-MCNC: 117 MG/DL
FASTING STATUS PATIENT QL REPORTED: NORMAL
GLOBULIN SER CALC-MCNC: 2.8 G/DL (ref 1.9–3.5)
GLUCOSE SERPL-MCNC: 98 MG/DL (ref 65–99)
HBA1C MFR BLD: 5.7 % (ref 0–5.6)
HDLC SERPL-MCNC: 69 MG/DL
LDLC SERPL CALC-MCNC: 93 MG/DL
POTASSIUM SERPL-SCNC: 4.5 MMOL/L (ref 3.6–5.5)
PROT SERPL-MCNC: 7.3 G/DL (ref 6–8.2)
SODIUM SERPL-SCNC: 139 MMOL/L (ref 135–145)
T3FREE SERPL-MCNC: 2.41 PG/ML (ref 2–4.4)
T4 FREE SERPL-MCNC: 0.71 NG/DL (ref 0.93–1.7)
TRIGL SERPL-MCNC: 126 MG/DL (ref 0–149)
TSH SERPL DL<=0.005 MIU/L-ACNC: 14.6 UIU/ML (ref 0.38–5.33)

## 2020-06-08 PROCEDURE — 80061 LIPID PANEL: CPT

## 2020-06-08 PROCEDURE — 84481 FREE ASSAY (FT-3): CPT

## 2020-06-08 PROCEDURE — 84443 ASSAY THYROID STIM HORMONE: CPT

## 2020-06-08 PROCEDURE — 84439 ASSAY OF FREE THYROXINE: CPT

## 2020-06-08 PROCEDURE — 80053 COMPREHEN METABOLIC PANEL: CPT

## 2020-06-08 PROCEDURE — 83036 HEMOGLOBIN GLYCOSYLATED A1C: CPT

## 2020-06-08 PROCEDURE — 36415 COLL VENOUS BLD VENIPUNCTURE: CPT

## 2020-08-12 ENCOUNTER — HOSPITAL ENCOUNTER (OUTPATIENT)
Dept: LAB | Facility: MEDICAL CENTER | Age: 55
End: 2020-08-12
Attending: STUDENT IN AN ORGANIZED HEALTH CARE EDUCATION/TRAINING PROGRAM
Payer: COMMERCIAL

## 2020-08-12 LAB
ANION GAP SERPL CALC-SCNC: 12 MMOL/L (ref 7–16)
BUN SERPL-MCNC: 15 MG/DL (ref 8–22)
CALCIUM SERPL-MCNC: 9.6 MG/DL (ref 8.5–10.5)
CHLORIDE SERPL-SCNC: 104 MMOL/L (ref 96–112)
CO2 SERPL-SCNC: 25 MMOL/L (ref 20–33)
CREAT SERPL-MCNC: 0.97 MG/DL (ref 0.5–1.4)
FASTING STATUS PATIENT QL REPORTED: NORMAL
GLUCOSE SERPL-MCNC: 125 MG/DL (ref 65–99)
POTASSIUM SERPL-SCNC: 3.8 MMOL/L (ref 3.6–5.5)
SODIUM SERPL-SCNC: 141 MMOL/L (ref 135–145)

## 2020-08-12 PROCEDURE — 36415 COLL VENOUS BLD VENIPUNCTURE: CPT

## 2020-08-12 PROCEDURE — 80048 BASIC METABOLIC PNL TOTAL CA: CPT

## 2020-09-11 ENCOUNTER — HOSPITAL ENCOUNTER (OUTPATIENT)
Dept: LAB | Facility: MEDICAL CENTER | Age: 55
End: 2020-09-11
Attending: PHYSICIAN ASSISTANT
Payer: COMMERCIAL

## 2020-09-11 LAB
T3FREE SERPL-MCNC: 2.49 PG/ML (ref 2–4.4)
T4 FREE SERPL-MCNC: 0.96 NG/DL (ref 0.93–1.7)
TSH SERPL DL<=0.005 MIU/L-ACNC: 4.48 UIU/ML (ref 0.38–5.33)

## 2020-09-11 PROCEDURE — 84439 ASSAY OF FREE THYROXINE: CPT

## 2020-09-11 PROCEDURE — 84443 ASSAY THYROID STIM HORMONE: CPT

## 2020-09-11 PROCEDURE — 84481 FREE ASSAY (FT-3): CPT

## 2020-09-11 PROCEDURE — 36415 COLL VENOUS BLD VENIPUNCTURE: CPT

## 2021-01-11 ENCOUNTER — HOSPITAL ENCOUNTER (OUTPATIENT)
Dept: LAB | Facility: MEDICAL CENTER | Age: 56
End: 2021-01-11
Attending: PHYSICIAN ASSISTANT
Payer: COMMERCIAL

## 2021-01-11 LAB
CHOLEST SERPL-MCNC: 192 MG/DL (ref 100–199)
EST. AVERAGE GLUCOSE BLD GHB EST-MCNC: 117 MG/DL
HBA1C MFR BLD: 5.7 % (ref 0–5.6)
HDLC SERPL-MCNC: 64 MG/DL
LDLC SERPL CALC-MCNC: 99 MG/DL
TRIGL SERPL-MCNC: 146 MG/DL (ref 0–149)

## 2021-01-11 PROCEDURE — 36415 COLL VENOUS BLD VENIPUNCTURE: CPT

## 2021-01-11 PROCEDURE — 80061 LIPID PANEL: CPT

## 2021-01-11 PROCEDURE — 83036 HEMOGLOBIN GLYCOSYLATED A1C: CPT

## 2021-03-15 DIAGNOSIS — Z23 NEED FOR VACCINATION: ICD-10-CM

## 2021-08-02 ENCOUNTER — HOSPITAL ENCOUNTER (OUTPATIENT)
Dept: LAB | Facility: MEDICAL CENTER | Age: 56
End: 2021-08-02
Attending: PHYSICIAN ASSISTANT
Payer: COMMERCIAL

## 2021-08-02 LAB
ALBUMIN SERPL BCP-MCNC: 4.7 G/DL (ref 3.2–4.9)
ALBUMIN/GLOB SERPL: 1.7 G/DL
ALP SERPL-CCNC: 57 U/L (ref 30–99)
ALT SERPL-CCNC: 16 U/L (ref 2–50)
ANION GAP SERPL CALC-SCNC: 11 MMOL/L (ref 7–16)
AST SERPL-CCNC: 16 U/L (ref 12–45)
BILIRUB SERPL-MCNC: 0.4 MG/DL (ref 0.1–1.5)
BUN SERPL-MCNC: 29 MG/DL (ref 8–22)
CALCIUM SERPL-MCNC: 9.9 MG/DL (ref 8.5–10.5)
CHLORIDE SERPL-SCNC: 104 MMOL/L (ref 96–112)
CHOLEST SERPL-MCNC: 176 MG/DL (ref 100–199)
CO2 SERPL-SCNC: 24 MMOL/L (ref 20–33)
CREAT SERPL-MCNC: 1.57 MG/DL (ref 0.5–1.4)
EST. AVERAGE GLUCOSE BLD GHB EST-MCNC: 120 MG/DL
FASTING STATUS PATIENT QL REPORTED: NORMAL
GLOBULIN SER CALC-MCNC: 2.8 G/DL (ref 1.9–3.5)
GLUCOSE SERPL-MCNC: 119 MG/DL (ref 65–99)
HBA1C MFR BLD: 5.8 % (ref 4–5.6)
HDLC SERPL-MCNC: 54 MG/DL
LDLC SERPL CALC-MCNC: 89 MG/DL
POTASSIUM SERPL-SCNC: 4.6 MMOL/L (ref 3.6–5.5)
PROT SERPL-MCNC: 7.5 G/DL (ref 6–8.2)
SODIUM SERPL-SCNC: 139 MMOL/L (ref 135–145)
T4 FREE SERPL-MCNC: 1.17 NG/DL (ref 0.93–1.7)
TRIGL SERPL-MCNC: 164 MG/DL (ref 0–149)
TSH SERPL DL<=0.005 MIU/L-ACNC: 3.77 UIU/ML (ref 0.38–5.33)

## 2021-08-02 PROCEDURE — 84439 ASSAY OF FREE THYROXINE: CPT

## 2021-08-02 PROCEDURE — 36415 COLL VENOUS BLD VENIPUNCTURE: CPT

## 2021-08-02 PROCEDURE — 80061 LIPID PANEL: CPT

## 2021-08-02 PROCEDURE — 80053 COMPREHEN METABOLIC PANEL: CPT

## 2021-08-02 PROCEDURE — 83036 HEMOGLOBIN GLYCOSYLATED A1C: CPT

## 2021-08-02 PROCEDURE — 84443 ASSAY THYROID STIM HORMONE: CPT

## 2022-03-29 ENCOUNTER — HOSPITAL ENCOUNTER (OUTPATIENT)
Dept: LAB | Facility: MEDICAL CENTER | Age: 57
End: 2022-03-29
Attending: PHYSICIAN ASSISTANT
Payer: COMMERCIAL

## 2022-03-29 PROCEDURE — 82570 ASSAY OF URINE CREATININE: CPT

## 2022-03-29 PROCEDURE — 80053 COMPREHEN METABOLIC PANEL: CPT

## 2022-03-29 PROCEDURE — 36415 COLL VENOUS BLD VENIPUNCTURE: CPT

## 2022-03-29 PROCEDURE — 82043 UR ALBUMIN QUANTITATIVE: CPT

## 2022-03-30 LAB
ALBUMIN SERPL BCP-MCNC: 5 G/DL (ref 3.2–4.9)
ALBUMIN/GLOB SERPL: 2.1 G/DL
ALP SERPL-CCNC: 78 U/L (ref 30–99)
ALT SERPL-CCNC: 23 U/L (ref 2–50)
ANION GAP SERPL CALC-SCNC: 11 MMOL/L (ref 7–16)
AST SERPL-CCNC: 20 U/L (ref 12–45)
BILIRUB SERPL-MCNC: 0.6 MG/DL (ref 0.1–1.5)
BUN SERPL-MCNC: 18 MG/DL (ref 8–22)
CALCIUM SERPL-MCNC: 9.3 MG/DL (ref 8.5–10.5)
CHLORIDE SERPL-SCNC: 103 MMOL/L (ref 96–112)
CO2 SERPL-SCNC: 24 MMOL/L (ref 20–33)
CREAT SERPL-MCNC: 1.16 MG/DL (ref 0.5–1.4)
CREAT UR-MCNC: 188.65 MG/DL
FASTING STATUS PATIENT QL REPORTED: NORMAL
GFR SERPLBLD CREATININE-BSD FMLA CKD-EPI: 73 ML/MIN/1.73 M 2
GLOBULIN SER CALC-MCNC: 2.4 G/DL (ref 1.9–3.5)
GLUCOSE SERPL-MCNC: 121 MG/DL (ref 65–99)
MICROALBUMIN UR-MCNC: 72.3 MG/DL
MICROALBUMIN/CREAT UR: 383 MG/G (ref 0–30)
POTASSIUM SERPL-SCNC: 4.6 MMOL/L (ref 3.6–5.5)
PROT SERPL-MCNC: 7.4 G/DL (ref 6–8.2)
SODIUM SERPL-SCNC: 138 MMOL/L (ref 135–145)

## 2022-08-30 ENCOUNTER — TELEPHONE (OUTPATIENT)
Dept: SCHEDULING | Facility: IMAGING CENTER | Age: 57
End: 2022-08-30

## 2022-09-13 ENCOUNTER — OFFICE VISIT (OUTPATIENT)
Dept: MEDICAL GROUP | Facility: PHYSICIAN GROUP | Age: 57
End: 2022-09-13
Payer: COMMERCIAL

## 2022-09-13 VITALS
TEMPERATURE: 97.6 F | DIASTOLIC BLOOD PRESSURE: 80 MMHG | HEIGHT: 71 IN | BODY MASS INDEX: 36.96 KG/M2 | OXYGEN SATURATION: 93 % | HEART RATE: 95 BPM | WEIGHT: 264 LBS | SYSTOLIC BLOOD PRESSURE: 140 MMHG

## 2022-09-13 DIAGNOSIS — I10 PRIMARY HYPERTENSION: ICD-10-CM

## 2022-09-13 DIAGNOSIS — E03.4 HYPOTHYROIDISM DUE TO ACQUIRED ATROPHY OF THYROID: ICD-10-CM

## 2022-09-13 DIAGNOSIS — Z00.00 ENCOUNTER FOR MEDICAL EXAMINATION TO ESTABLISH CARE: ICD-10-CM

## 2022-09-13 DIAGNOSIS — R53.83 FATIGUE, UNSPECIFIED TYPE: ICD-10-CM

## 2022-09-13 DIAGNOSIS — R73.03 PREDIABETES: ICD-10-CM

## 2022-09-13 DIAGNOSIS — E55.9 VITAMIN D DEFICIENCY: ICD-10-CM

## 2022-09-13 DIAGNOSIS — Z12.11 ENCOUNTER FOR SCREENING FOR MALIGNANT NEOPLASM OF COLON: ICD-10-CM

## 2022-09-13 DIAGNOSIS — Z12.5 PROSTATE CANCER SCREENING: ICD-10-CM

## 2022-09-13 PROCEDURE — 99214 OFFICE O/P EST MOD 30 MIN: CPT

## 2022-09-13 RX ORDER — LEVOTHYROXINE SODIUM 0.07 MG/1
75 TABLET ORAL DAILY
COMMUNITY
Start: 2022-07-18 | End: 2023-01-06 | Stop reason: SDUPTHER

## 2022-09-13 ASSESSMENT — PATIENT HEALTH QUESTIONNAIRE - PHQ9: CLINICAL INTERPRETATION OF PHQ2 SCORE: 0

## 2022-09-13 NOTE — ASSESSMENT & PLAN NOTE
Chronic, not controlled.  BP is 140/80.  Patient is not sure but believes he is managed on Telmesartan-Hydrochlorthiazide. Dose unknown.  I talked with the patient about adding a medication to keep his BP more controlled. He is not open to this.  If BP continues to be elevated, consider Amlodipine 2.5 -5 mg.       TENDERNESS/PAIN

## 2022-09-14 NOTE — PROGRESS NOTES
"Subjective:     CC:    Chief Complaint   Patient presents with    Establish Care       HISTORY OF THE PRESENT ILLNESS: Kg is a pleasant 57 y.o. male here today to establish care for the conditions below. His prior PCP was Giovanna HILL.  Problem   Fatigue    Patient requesting testosterone levels checked due to fatigue.     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  Due for updated labs today     Htn (Hypertension)    Patient does not recall what medication he is taking for his blood pressure. He said it's a sartan and a diuretic combined.  He said it is not Losartan or Olmesartan.  When asked if it's Telmesartan-Hydrochlorothiazide he said he believes that is accurate.  He monitors it at home and states it usually is high 130s-140s.         Health Maintenance: Completed    ROS:   All systems negative except as addressed in assessment and plan.       Objective:     Exam: BP (!) 140/80 (BP Location: Left arm, Patient Position: Sitting, BP Cuff Size: Adult)   Pulse 95   Temp 36.4 °C (97.6 °F) (Temporal)   Ht 1.803 m (5' 11\")   Wt 120 kg (264 lb)   SpO2 93%  Body mass index is 36.82 kg/m².    Physical Exam  Constitutional:       Appearance: Normal appearance.   Cardiovascular:      Rate and Rhythm: Normal rate.   Pulmonary:      Effort: Pulmonary effort is normal.   Musculoskeletal:         General: Normal range of motion.   Neurological:      Mental Status: He is alert. Mental status is at baseline.   Psychiatric:         Mood and Affect: Mood normal.         Behavior: Behavior normal.     Labs: Reviewed from 07/03/2019      Assessment & Plan:   57 y.o. male with the following -    1. Encounter for medical examination to establish care  Health conditions and medications reviewed and updated. All screenings discussed and up-to-date. Health maintenance completed.     - Lipid Profile; Future  - HEMOGLOBIN A1C; Future  - Comp Metabolic Panel; Future  - CBC WITH " DIFFERENTIAL; Future  - VITAMIN D,25 HYDROXY (DEFICIENCY); Future  - TSH WITH REFLEX TO FT4; Future    2. Primary hypertension  Chronic, not controlled.  BP is 140/80.  Patient is not sure but believes he is managed on Telmesartan-Hydrochlorthiazide. Dose unknown.  I talked with the patient about adding a medication to keep his BP more controlled. He is not open to this.  If BP continues to be elevated, consider Amlodipine 2.5 -5 mg    3. Hypothyroidism due to acquired atrophy of thyroid  Chronic, controlled.  Managed on levothyroxine 75 mcg.  TSH stable.   Labs due. Consider dose change if indicated.  - TSH WITH REFLEX TO FT4; Future    4. Prediabetes  Chronic, ongoing.  Patient has had elevated A1C for several years. Continue to monitor annually.  - HEMOGLOBIN A1C; Future    5. Fatigue, unspecified type  - TESTOSTERONE, FREE AND TOTAL; Future    6. BMI 36.0-36.9,adult  Body mass index is 36.82 kg/m².    7. Vitamin D deficiency  - VITAMIN D,25 HYDROXY (DEFICIENCY); Future    8. Encounter for screening for malignant neoplasm of colon  - YOVANI (FIT DNA)    9. Prostate cancer screening  - PROSTATE SPECIFIC AG SCREENING; Future    Other orders  - levothyroxine (SYNTHROID) 75 MCG Tab; Take 75 mcg by mouth every day.    Patient was educated in proper administration of medication(s) ordered today including safety, possible SE, risks, benefits, rationale and alternatives to therapy.   Supportive care, differential diagnoses, and indications for immediate follow-up discussed with patient.    Pathogenesis of diagnosis discussed including typical length and natural progression.    Instructed to return to clinic or nearest emergency department for any change in condition, further concerns, or worsening of symptoms.  Patient states understanding of the plan of care and discharge instructions.    Return in about 4 weeks (around 10/11/2022) for Lab Review.    I have placed the above orders and discussed them with an approved  delegating provider.  The MA is performing the below orders under the direction of Dr. Bobo.    Please note that this dictation was created using voice recognition software. I have worked with consultants from the vendor as well as technical experts from [facility] to optimize the interface. I have made every reasonable attempt to correct obvious errors, but I expect that there are errors of grammar and possibly content that I did not discover before finalizing the note.

## 2022-12-12 ENCOUNTER — HOSPITAL ENCOUNTER (OUTPATIENT)
Dept: LAB | Facility: MEDICAL CENTER | Age: 57
End: 2022-12-12
Payer: COMMERCIAL

## 2022-12-12 DIAGNOSIS — Z00.00 ENCOUNTER FOR MEDICAL EXAMINATION TO ESTABLISH CARE: ICD-10-CM

## 2022-12-12 DIAGNOSIS — R53.83 FATIGUE, UNSPECIFIED TYPE: ICD-10-CM

## 2022-12-12 DIAGNOSIS — R73.03 PREDIABETES: ICD-10-CM

## 2022-12-12 DIAGNOSIS — E03.4 HYPOTHYROIDISM DUE TO ACQUIRED ATROPHY OF THYROID: ICD-10-CM

## 2022-12-12 DIAGNOSIS — E55.9 VITAMIN D DEFICIENCY: ICD-10-CM

## 2022-12-12 DIAGNOSIS — Z12.5 PROSTATE CANCER SCREENING: ICD-10-CM

## 2022-12-12 LAB
25(OH)D3 SERPL-MCNC: 36 NG/ML (ref 30–100)
ALBUMIN SERPL BCP-MCNC: 4.5 G/DL (ref 3.2–4.9)
ALBUMIN/GLOB SERPL: 1.6 G/DL
ALP SERPL-CCNC: 77 U/L (ref 30–99)
ALT SERPL-CCNC: 17 U/L (ref 2–50)
ANION GAP SERPL CALC-SCNC: 9 MMOL/L (ref 7–16)
AST SERPL-CCNC: 13 U/L (ref 12–45)
BASOPHILS # BLD AUTO: 0.7 % (ref 0–1.8)
BASOPHILS # BLD: 0.04 K/UL (ref 0–0.12)
BILIRUB SERPL-MCNC: 0.3 MG/DL (ref 0.1–1.5)
BUN SERPL-MCNC: 28 MG/DL (ref 8–22)
CALCIUM SERPL-MCNC: 9.6 MG/DL (ref 8.5–10.5)
CHLORIDE SERPL-SCNC: 103 MMOL/L (ref 96–112)
CHOLEST SERPL-MCNC: 215 MG/DL (ref 100–199)
CO2 SERPL-SCNC: 26 MMOL/L (ref 20–33)
CREAT SERPL-MCNC: 1.03 MG/DL (ref 0.5–1.4)
EOSINOPHIL # BLD AUTO: 0.11 K/UL (ref 0–0.51)
EOSINOPHIL NFR BLD: 1.9 % (ref 0–6.9)
ERYTHROCYTE [DISTWIDTH] IN BLOOD BY AUTOMATED COUNT: 47 FL (ref 35.9–50)
EST. AVERAGE GLUCOSE BLD GHB EST-MCNC: 123 MG/DL
GFR SERPLBLD CREATININE-BSD FMLA CKD-EPI: 84 ML/MIN/1.73 M 2
GLOBULIN SER CALC-MCNC: 2.9 G/DL (ref 1.9–3.5)
GLUCOSE SERPL-MCNC: 108 MG/DL (ref 65–99)
HBA1C MFR BLD: 5.9 % (ref 4–5.6)
HCT VFR BLD AUTO: 48.2 % (ref 42–52)
HDLC SERPL-MCNC: 71 MG/DL
HGB BLD-MCNC: 15.4 G/DL (ref 14–18)
IMM GRANULOCYTES # BLD AUTO: 0.05 K/UL (ref 0–0.11)
IMM GRANULOCYTES NFR BLD AUTO: 0.8 % (ref 0–0.9)
LDLC SERPL CALC-MCNC: 120 MG/DL
LYMPHOCYTES # BLD AUTO: 1.6 K/UL (ref 1–4.8)
LYMPHOCYTES NFR BLD: 26.9 % (ref 22–41)
MCH RBC QN AUTO: 30.7 PG (ref 27–33)
MCHC RBC AUTO-ENTMCNC: 32 G/DL (ref 33.7–35.3)
MCV RBC AUTO: 96 FL (ref 81.4–97.8)
MONOCYTES # BLD AUTO: 0.43 K/UL (ref 0–0.85)
MONOCYTES NFR BLD AUTO: 7.2 % (ref 0–13.4)
NEUTROPHILS # BLD AUTO: 3.71 K/UL (ref 1.82–7.42)
NEUTROPHILS NFR BLD: 62.5 % (ref 44–72)
NRBC # BLD AUTO: 0 K/UL
NRBC BLD-RTO: 0 /100 WBC
PLATELET # BLD AUTO: 269 K/UL (ref 164–446)
PMV BLD AUTO: 10.6 FL (ref 9–12.9)
POTASSIUM SERPL-SCNC: 4.8 MMOL/L (ref 3.6–5.5)
PROT SERPL-MCNC: 7.4 G/DL (ref 6–8.2)
PSA SERPL-MCNC: 0.95 NG/ML (ref 0–4)
RBC # BLD AUTO: 5.02 M/UL (ref 4.7–6.1)
SODIUM SERPL-SCNC: 138 MMOL/L (ref 135–145)
T4 FREE SERPL-MCNC: 0.58 NG/DL (ref 0.93–1.7)
TRIGL SERPL-MCNC: 122 MG/DL (ref 0–149)
TSH SERPL DL<=0.005 MIU/L-ACNC: 14.9 UIU/ML (ref 0.38–5.33)
WBC # BLD AUTO: 5.9 K/UL (ref 4.8–10.8)

## 2022-12-12 PROCEDURE — 84443 ASSAY THYROID STIM HORMONE: CPT

## 2022-12-12 PROCEDURE — 82306 VITAMIN D 25 HYDROXY: CPT

## 2022-12-12 PROCEDURE — 84153 ASSAY OF PSA TOTAL: CPT

## 2022-12-12 PROCEDURE — 84439 ASSAY OF FREE THYROXINE: CPT

## 2022-12-12 PROCEDURE — 36415 COLL VENOUS BLD VENIPUNCTURE: CPT

## 2022-12-12 PROCEDURE — 84403 ASSAY OF TOTAL TESTOSTERONE: CPT

## 2022-12-12 PROCEDURE — 80061 LIPID PANEL: CPT

## 2022-12-12 PROCEDURE — 85025 COMPLETE CBC W/AUTO DIFF WBC: CPT

## 2022-12-12 PROCEDURE — 84270 ASSAY OF SEX HORMONE GLOBUL: CPT

## 2022-12-12 PROCEDURE — 83036 HEMOGLOBIN GLYCOSYLATED A1C: CPT

## 2022-12-12 PROCEDURE — 80053 COMPREHEN METABOLIC PANEL: CPT

## 2022-12-12 PROCEDURE — 84402 ASSAY OF FREE TESTOSTERONE: CPT

## 2022-12-14 DIAGNOSIS — R79.89 LOW TESTOSTERONE: ICD-10-CM

## 2022-12-14 DIAGNOSIS — E03.4 HYPOTHYROIDISM DUE TO ACQUIRED ATROPHY OF THYROID: ICD-10-CM

## 2022-12-14 LAB
SHBG SERPL-SCNC: 34 NMOL/L (ref 19–76)
TESTOST FREE MFR SERPL: 1.7 % (ref 1.6–2.9)
TESTOST FREE SERPL-MCNC: 43 PG/ML (ref 47–244)
TESTOST SERPL-MCNC: 245 NG/DL (ref 300–890)

## 2023-01-06 ENCOUNTER — OFFICE VISIT (OUTPATIENT)
Dept: MEDICAL GROUP | Facility: PHYSICIAN GROUP | Age: 58
End: 2023-01-06
Payer: COMMERCIAL

## 2023-01-06 VITALS
BODY MASS INDEX: 37.02 KG/M2 | OXYGEN SATURATION: 97 % | WEIGHT: 264.4 LBS | HEART RATE: 65 BPM | DIASTOLIC BLOOD PRESSURE: 80 MMHG | TEMPERATURE: 98.4 F | HEIGHT: 71 IN | SYSTOLIC BLOOD PRESSURE: 122 MMHG

## 2023-01-06 DIAGNOSIS — E03.4 HYPOTHYROIDISM DUE TO ACQUIRED ATROPHY OF THYROID: ICD-10-CM

## 2023-01-06 DIAGNOSIS — I10 PRIMARY HYPERTENSION: ICD-10-CM

## 2023-01-06 DIAGNOSIS — E78.2 MIXED HYPERLIPIDEMIA: ICD-10-CM

## 2023-01-06 DIAGNOSIS — R79.89 LOW TESTOSTERONE: ICD-10-CM

## 2023-01-06 DIAGNOSIS — R73.03 PREDIABETES: ICD-10-CM

## 2023-01-06 PROCEDURE — 99214 OFFICE O/P EST MOD 30 MIN: CPT

## 2023-01-06 RX ORDER — LEVOTHYROXINE SODIUM 0.07 MG/1
75 TABLET ORAL DAILY
Qty: 90 TABLET | Refills: 0 | Status: SHIPPED | OUTPATIENT
Start: 2023-01-06 | End: 2023-04-06

## 2023-01-06 ASSESSMENT — FIBROSIS 4 INDEX: FIB4 SCORE: 0.68

## 2023-01-06 ASSESSMENT — PATIENT HEALTH QUESTIONNAIRE - PHQ9: CLINICAL INTERPRETATION OF PHQ2 SCORE: 0

## 2023-01-06 NOTE — PROGRESS NOTES
"Subjective:     CC:   Chief Complaint   Patient presents with    Lab Results    Medication Refill       HISTORY OF THE PRESENT ILLNESS: Kg is a pleasant 57 y.o. male here today to follow-up on his labs.    Problem   Low Testosterone   Mixed Hyperlipidemia   Prediabetes   Hypothyroidism Due to Acquired Atrophy of Thyroid    Hx of Graves  Patient states he has been off his levothyroxine for several months because no one will refill it.         Htn (Hypertension)    Patient does monitor his BP at home and states he averages 120s/70s.  There does seem to be confusion about if he takes medication at home.  I have no record of medication in his chart.   At his last appointment, he said he used to be on medication but has been off of it for a while.  He could not recall what it was.  Today, patient states he is taking a medication but is unsure what it is.  He is unsure who prescribed it.  He states he ran out 2 days ago.           Health Maintenance: Completed    ROS:  All systems negative expect as addressed in assessment and plan.     Objective:     Exam:  /80 (BP Location: Right arm, Patient Position: Sitting, BP Cuff Size: Adult)   Pulse 65   Temp 36.9 °C (98.4 °F) (Temporal)   Ht 1.803 m (5' 11\")   Wt 120 kg (264 lb 6.4 oz)   SpO2 97%   BMI 36.88 kg/m²  Body mass index is 36.88 kg/m².    Physical Exam  Constitutional:       Appearance: Normal appearance.   Cardiovascular:      Rate and Rhythm: Normal rate.   Pulmonary:      Effort: Pulmonary effort is normal.   Musculoskeletal:         General: Normal range of motion.   Neurological:      Mental Status: He is alert. Mental status is at baseline.   Psychiatric:         Mood and Affect: Mood normal.         Behavior: Behavior normal.       Labs: Results reviewed from 12/12/22    Assessment & Plan:     57 y.o. male with the following -    1. Primary hypertension  Chronic, controlled.  122/80 in clinic.  Unclear if patient is taking medication.  His " account of whether he is taking something and what it is consistently changes.  Again, I have no record of a blood pressure medication.  Patient states he gets it at [facility].  We will call [facility] to confirm.    2. Hypothyroidism due to acquired atrophy of thyroid  Chronic, not controlled.  Patient is not currently taking his medication.  He states he ran out several months ago because no one will refill it.  Review of his record shows he received a refill 5 months ago.  At our last appointment, he did tell me he did not need a refill.  I will refill this today.  Patient to redo labs in 6 weeks.  - levothyroxine (SYNTHROID) 75 MCG Tab; Take 1 Tablet by mouth every day.  Dispense: 90 Tablet; Refill: 0    3. Mixed hyperlipidemia  Chronic, stable.  The 10-year ASCVD risk score (Jorge BEAR, et al., 2019) is: 5.5%  Not on medication.  Patient counseled on diet and lifestyle modification.  Will trend annually.   Latest Reference Range & Units 12/12/22 06:59   Cholesterol,Tot 100 - 199 mg/dL 215 (H)   Triglycerides 0 - 149 mg/dL 122   HDL >=40 mg/dL 71   LDL <100 mg/dL 120 (H)       4. Prediabetes  Chronic, ongoing.  Glucose 108 and A1C 5.9    - continue healthy diet, weight reduction, daily physical activity   - consider metformin up to 850mg BID to slow or offset progression to T2D as per DPP trial   - monitor labs Q6-12mo     5. Low testosterone  New problem found on labs.  Testosterone was checked due to feelings of fatigue.  Patient encouraged to get repeat labs before any interventions.  Labs ordered.    Patient was educated in proper administration of medication(s) ordered today including safety, possible SE, risks, benefits, rationale and alternatives to therapy.   Supportive care, differential diagnoses, and indications for immediate follow-up discussed with patient.    Pathogenesis of diagnosis discussed including typical length and natural progression.    Instructed to return to clinic or nearest emergency  department for any change in condition, further concerns, or worsening of symptoms.  Patient states understanding of the plan of care and discharge instructions.    Return in about 3 months (around 4/6/2023) for Follow-up BP.    I spent a total of 35 minutes with record review, exam, and communication with the patient, communication with other providers, and documentation of this encounter. This does not include time spent on separately billable procedures/tests.    I have placed the above orders and discussed them with an approved delegating provider.  The MA is performing the below orders under the direction of Dr. Bobo.    Please note that this dictation was created using voice recognition software. I have worked with consultants from the vendor as well as technical experts from [facility] to optimize the interface. I have made every reasonable attempt to correct obvious errors, but I expect that there are errors of grammar and possibly content that I did not discover before finalizing the note.

## 2023-01-06 NOTE — ASSESSMENT & PLAN NOTE
Chronic, ongoing.  Glucose 108 and A1C 5.9    - continue healthy diet, weight reduction, daily physical activity   - consider metformin up to 850mg BID to slow or offset progression to T2D as per DPP trial   - monitor labs Q6-12mo

## 2023-01-06 NOTE — ASSESSMENT & PLAN NOTE
Chronic, stable.  The 10-year ASCVD risk score (Jorge BEAR, et al., 2019) is: 5.5%  Not on medication.  Patient counseled on diet and lifestyle modification.  Will trend annually.   Latest Reference Range & Units 12/12/22 06:59   Cholesterol,Tot 100 - 199 mg/dL 215 (H)   Triglycerides 0 - 149 mg/dL 122   HDL >=40 mg/dL 71   LDL <100 mg/dL 120 (H)   (H): Data is abnormally high

## 2023-01-06 NOTE — ASSESSMENT & PLAN NOTE
New problem found on labs.  Testosterone was checked due to feelings of fatigue.  Patient encouraged to get repeat labs before any interventions.  Labs ordered.

## 2023-01-06 NOTE — ASSESSMENT & PLAN NOTE
Chronic, controlled.  122/80 in clinic.  Unclear if patient is taking medication.  His account of whether he is taking something and what it is consistently changes.  Again, I have no record of a blood pressure medication.  Patient states he gets it at [facility].  We will call [facility] to confirm.

## 2023-01-06 NOTE — ASSESSMENT & PLAN NOTE
Chronic, not controlled.  Patient is not currently taking his medication.  He states he ran out several months ago because no one will refill it.  Review of his record shows he received a refill 5 months ago.  At our last appointment, he did tell me he did not need a refill.  I will refill this today.  Patient to redo labs in 6 weeks.

## 2023-02-20 ENCOUNTER — HOSPITAL ENCOUNTER (OUTPATIENT)
Dept: LAB | Facility: MEDICAL CENTER | Age: 58
End: 2023-02-20
Payer: COMMERCIAL

## 2023-02-20 DIAGNOSIS — E03.4 HYPOTHYROIDISM DUE TO ACQUIRED ATROPHY OF THYROID: ICD-10-CM

## 2023-02-20 DIAGNOSIS — R79.89 LOW TESTOSTERONE: ICD-10-CM

## 2023-02-20 LAB
FSH SERPL-ACNC: 12.8 MIU/ML (ref 1.5–12.4)
LH SERPL-ACNC: 5.3 IU/L (ref 1.7–8.6)
T4 FREE SERPL-MCNC: 0.97 NG/DL (ref 0.93–1.7)
TSH SERPL DL<=0.005 MIU/L-ACNC: 7.18 UIU/ML (ref 0.38–5.33)

## 2023-02-20 PROCEDURE — 36415 COLL VENOUS BLD VENIPUNCTURE: CPT

## 2023-02-20 PROCEDURE — 84443 ASSAY THYROID STIM HORMONE: CPT

## 2023-02-20 PROCEDURE — 84270 ASSAY OF SEX HORMONE GLOBUL: CPT

## 2023-02-20 PROCEDURE — 83002 ASSAY OF GONADOTROPIN (LH): CPT

## 2023-02-20 PROCEDURE — 84402 ASSAY OF FREE TESTOSTERONE: CPT

## 2023-02-20 PROCEDURE — 83001 ASSAY OF GONADOTROPIN (FSH): CPT

## 2023-02-20 PROCEDURE — 84439 ASSAY OF FREE THYROXINE: CPT

## 2023-02-20 PROCEDURE — 84403 ASSAY OF TOTAL TESTOSTERONE: CPT

## 2023-02-21 LAB
SHBG SERPL-SCNC: 26 NMOL/L (ref 19–76)
TESTOST FREE MFR SERPL: 2 % (ref 1.6–2.9)
TESTOST FREE SERPL-MCNC: 42 PG/ML (ref 47–244)
TESTOST SERPL-MCNC: 213 NG/DL (ref 300–890)

## 2023-02-22 DIAGNOSIS — R79.89 LOW TESTOSTERONE: ICD-10-CM

## 2023-03-01 ENCOUNTER — E-CONSULT (OUTPATIENT)
Dept: ENDOCRINOLOGY | Facility: MEDICAL CENTER | Age: 58
End: 2023-03-01
Payer: COMMERCIAL

## 2023-03-01 DIAGNOSIS — Z71.9 ENCOUNTER FOR CONSULTATION: ICD-10-CM

## 2023-03-01 PROCEDURE — 99449 NTRPROF PH1/NTRNET/EHR 31/>: CPT | Performed by: INTERNAL MEDICINE

## 2023-03-02 NOTE — PROGRESS NOTES
E-Consult Response     After careful review of the patient's information available in the medical record, the following are my findings and recommendations:    Reason for consult: Recommendations for interpretation of labs patient with hypogonadism    Summary of data reviewed: In summary this is a 57-year-old male born on March 21, 1965    He has a medical history of hypothyroidism is on thyroid hormone replacement therapy with levothyroxine 75 mcg daily      Primary care conducted a work-up for hypogonadism  Which showed an elevated FSH of 12 on February 20 2023    Normal LH of 5.3 on February 20, 2023    Low total testosterone on 2 separate occasions 213 on February 20, 2023     and 245 on December 12, 2022      Of note his TSH was uncontrolled at 7.1 when he had labs on  February 20, 2023    Recommendations: Technically speaking this is a mixed picture but the FSH is not markedly elevated    FSH per se reflects the pituitary feedback on spermatogenesis  Which obviously declines with age    But LH is more reflective of the feedback on testosterone    So technically speaking the patient still has secondary hypogonadism      He is a candidate for testosterone replacement therapy but I would try to repeat his labs again once his hypothyroidism is better controlled    His hypothyroidism was uncontrolled when he had the labs for his testosterone and this could also explain his fatigue      Other more reliable symptoms for hypogonadism would be decreased libido erection is not as reliable because not all erectile dysfunction is because of hypogonadism            E-Consult Time: 31 minutes were spent with >50% of the total time spent discussing plan of care with requesting physician (Use code [id number])    Facundo Bess M.D.

## 2023-04-06 ENCOUNTER — OFFICE VISIT (OUTPATIENT)
Dept: MEDICAL GROUP | Facility: PHYSICIAN GROUP | Age: 58
End: 2023-04-06
Payer: COMMERCIAL

## 2023-04-06 VITALS
BODY MASS INDEX: 36.82 KG/M2 | DIASTOLIC BLOOD PRESSURE: 76 MMHG | HEART RATE: 84 BPM | SYSTOLIC BLOOD PRESSURE: 140 MMHG | OXYGEN SATURATION: 91 % | HEIGHT: 71 IN | TEMPERATURE: 98.1 F | WEIGHT: 263 LBS

## 2023-04-06 DIAGNOSIS — R79.89 LOW TESTOSTERONE IN MALE: ICD-10-CM

## 2023-04-06 DIAGNOSIS — E03.4 HYPOTHYROIDISM DUE TO ACQUIRED ATROPHY OF THYROID: ICD-10-CM

## 2023-04-06 DIAGNOSIS — E29.1 PRIMARY HYPOGONADISM IN MALE: ICD-10-CM

## 2023-04-06 DIAGNOSIS — I10 PRIMARY HYPERTENSION: ICD-10-CM

## 2023-04-06 PROBLEM — E66.9 OBESITY (BMI 35.0-39.9 WITHOUT COMORBIDITY): Status: ACTIVE | Noted: 2022-09-13

## 2023-04-06 PROCEDURE — 99214 OFFICE O/P EST MOD 30 MIN: CPT

## 2023-04-06 RX ORDER — TELMISARTAN 20 MG/1
20 TABLET ORAL DAILY
Qty: 30 TABLET | Refills: 3 | Status: SHIPPED | OUTPATIENT
Start: 2023-04-06 | End: 2023-04-27

## 2023-04-06 RX ORDER — LEVOTHYROXINE SODIUM 88 UG/1
88 TABLET ORAL
Qty: 90 TABLET | Refills: 0 | Status: SHIPPED | OUTPATIENT
Start: 2023-04-06 | End: 2023-07-24

## 2023-04-06 ASSESSMENT — FIBROSIS 4 INDEX: FIB4 SCORE: 0.69

## 2023-04-06 NOTE — PROGRESS NOTES
"Subjective:     CC:   Chief Complaint   Patient presents with    Lab Results    Medication Refill     HISTORY OF THE PRESENT ILLNESS: Kg is a pleasant 58 y.o. male here today to follow-up on his labs, which showed low testosterone with elevated FSH.    Patient states ever since he was diagnosed with COVID 3 years ago, he has had extreme fatigue.  He has read it can impact testosterone, which is why he requested this be checked.    Patient also has uncontrolled hypothyroidism, despite taking his levothyroxine daily.    Health Maintenance: Completed    ROS:  All systems negative expect as addressed in assessment and plan.     Objective:     Exam:  BP (!) 140/76 (BP Location: Right arm, Patient Position: Sitting, BP Cuff Size: Adult)   Pulse 84   Temp 36.7 °C (98.1 °F) (Temporal)   Ht 1.803 m (5' 11\")   Wt 119 kg (263 lb)   SpO2 91%   BMI 36.68 kg/m²  Body mass index is 36.68 kg/m².    Physical Exam  Constitutional:       Appearance: Normal appearance.   Cardiovascular:      Rate and Rhythm: Normal rate.   Pulmonary:      Effort: Pulmonary effort is normal.   Abdominal:      General: Bowel sounds are normal.      Palpations: Abdomen is soft.   Musculoskeletal:         General: Normal range of motion.   Neurological:      Mental Status: He is alert. Mental status is at baseline.   Psychiatric:         Mood and Affect: Mood normal.         Behavior: Behavior normal.     Labs: Results reviewed from 02/20/23 and 12/12/23    Assessment & Plan:     58 y.o. male with the following -    1. Hypothyroidism due to acquired atrophy of thyroid  Chronic, not controlled.  Patient endorses taking his levothyroxine every morning on an empty stomach.   Latest Reference Range & Units 02/20/23 08:02   TSH 0.380 - 5.330 uIU/mL 7.180 (H)   Free T-4 0.93 - 1.70 ng/dL 0.97     - levothyroxine (SYNTHROID) 88 MCG Tab; Take 1 Tablet by mouth every morning on an empty stomach.  Dispense: 90 Tablet; Refill: 0  - TSH WITH REFLEX TO FT4; " Future    2. Low testosterone in male  This is a new problem.  FSH is elevated: Assumed primary hypogonadism.  Other considerations: Patient has elevated BMI of 36.68 and unstable thyroid.  We will work to stabilize thyroid and place a consult to endocrinology for insight on if this patient is a candidate for testosterone replacement.     Latest Reference Range & Units 12/12/22 06:59 02/20/23 08:02   Follicle Stimulating Hormone 1.5 - 12.4 mIU/mL  12.8 (H)   Free Testosterone 47 - 244 pg/mL 43 (L) 42 (L)   Luteinizing Hormone 1.7 - 8.6 IU/L  5.3   Sex Hormone Bind Globulin 19 - 76 nmol/L 34 26   Testosterone % Free 1.6 - 2.9 % 1.7 2.0   Testosterone,Total 300 - 890 ng/dL 245 (L) 213 (L)     3. Primary hypertension  Chronic, not controlled.  It is unclear if patient has been on medication recently.  Patient states in the past, he was managed on telmisartan-hydrochlorothiazide.  According to the pharmacy, this has not been prescribed in several years.  Patient swears he has been taking it.  His blood pressure was stable.  Patient states he is no longer taking it and his blood pressure is now elevated at 140/76.    Patient states lisinopril caused coughing.  -Will start patient on telmisartan 20 mg daily.    Patient was educated in proper administration of medication(s) ordered today including safety, possible SE, risks, benefits, rationale and alternatives to therapy.   Supportive care, differential diagnoses, and indications for immediate follow-up discussed with patient.    Pathogenesis of diagnosis discussed including typical length and natural progression.    Instructed to return to clinic or nearest emergency department for any change in condition, further concerns, or worsening of symptoms.  Patient states understanding of the plan of care and discharge instructions.    Return in about 2 months (around 6/6/2023) for Low testosterone, Follow-up BP, hypothyroid.    I spent a total of 31 minutes with record review,  exam, and communication with the patient, communication with other providers, and documentation of this encounter. This does not include time spent on separately billable procedures/tests.    I have placed the above orders and discussed them with an approved delegating provider.  The MA is performing the below orders under the direction of Dr. Bobo.    Please note that this dictation was created using voice recognition software. I have worked with consultants from the vendor as well as technical experts from [facility] to optimize the interface. I have made every reasonable attempt to correct obvious errors, but I expect that there are errors of grammar and possibly content that I did not discover before finalizing the note.

## 2023-04-11 ENCOUNTER — E-CONSULT (OUTPATIENT)
Dept: ENDOCRINOLOGY | Facility: MEDICAL CENTER | Age: 58
End: 2023-04-11
Payer: COMMERCIAL

## 2023-04-11 DIAGNOSIS — Z71.9 ENCOUNTER FOR CONSULTATION: ICD-10-CM

## 2023-04-11 PROCEDURE — 99449 NTRPROF PH1/NTRNET/EHR 31/>: CPT | Performed by: INTERNAL MEDICINE

## 2023-04-11 NOTE — PROGRESS NOTES
E-Consult Response     After careful review of the patient's information available in the medical record, the following are my findings and recommendations:    Reason for consult:  Recommendations for patient with low testosterone    Summary of data reviewed: This is a 58-year-old gentleman born on March 21, 1965      He has 2 sets of labs showing evidence of biochemical hypogonadism he has hypogonadotropic hypogonadism as his LH is normal total testosterone is low FSH is slightly elevated on 1 lab but this is not significantly elevated to indicate primary hypogonadism    His total testosterone was 213 and 245 on February 2023 in December 2022 respectively     He does have primary hypogonadism with uncontrolled TSH levels also being treated by his primary care with thyroid hormone        Recommendations: Agree with patient having hypogonadotrophic hypogonadism and that he may benefit from androgen replacement therapy.  It depends on the patient and his insurance as to whether he would be best treated with intramuscular testosterone injections or gel or another alternative form of androgen replacement therapy    I agree with continuation of thyroid hormone replacement therapy and adjustment of medication to optimize TSH levels.    FSH elevation is not significant to be compatible with primary hypogonadism because of his age he may have some underlying problem with his seminiferous tubules which could explain the FSH elevation (FSH mainly promote spermatogenesis)    (LH is responsible for testosterone production)    E-Consult Time: 31 minutes were spent with >50% of the total time spent discussing plan of care with requesting physician (Use code [id number])    Facundo Bess M.D.

## 2023-04-27 ENCOUNTER — OFFICE VISIT (OUTPATIENT)
Dept: MEDICAL GROUP | Facility: PHYSICIAN GROUP | Age: 58
End: 2023-04-27
Payer: COMMERCIAL

## 2023-04-27 VITALS
HEART RATE: 69 BPM | OXYGEN SATURATION: 95 % | HEIGHT: 71 IN | BODY MASS INDEX: 35.98 KG/M2 | DIASTOLIC BLOOD PRESSURE: 82 MMHG | TEMPERATURE: 98.5 F | WEIGHT: 257 LBS | SYSTOLIC BLOOD PRESSURE: 144 MMHG

## 2023-04-27 DIAGNOSIS — Z51.81 ENCOUNTER FOR MONITORING DIURETIC THERAPY: ICD-10-CM

## 2023-04-27 DIAGNOSIS — Z51.81 ENCOUNTER FOR MONITORING TESTOSTERONE REPLACEMENT THERAPY: ICD-10-CM

## 2023-04-27 DIAGNOSIS — I10 PRIMARY HYPERTENSION: ICD-10-CM

## 2023-04-27 DIAGNOSIS — Z79.899 ENCOUNTER FOR MONITORING DIURETIC THERAPY: ICD-10-CM

## 2023-04-27 DIAGNOSIS — Z79.890 ENCOUNTER FOR MONITORING TESTOSTERONE REPLACEMENT THERAPY: ICD-10-CM

## 2023-04-27 DIAGNOSIS — R79.89 LOW TESTOSTERONE: ICD-10-CM

## 2023-04-27 DIAGNOSIS — E66.9 OBESITY (BMI 35.0-39.9 WITHOUT COMORBIDITY): ICD-10-CM

## 2023-04-27 PROCEDURE — 99214 OFFICE O/P EST MOD 30 MIN: CPT

## 2023-04-27 RX ORDER — TELMISARTAN AND HYDROCHLORTHIAZIDE 40; 12.5 MG/1; MG/1
1 TABLET ORAL DAILY
Qty: 90 TABLET | Refills: 0 | Status: SHIPPED | OUTPATIENT
Start: 2023-04-27

## 2023-04-27 RX ORDER — TESTOSTERONE CYPIONATE 1000 MG/10ML
1 INJECTION, SOLUTION INTRAMUSCULAR
Qty: 10 ML | Refills: 0 | Status: SHIPPED | OUTPATIENT
Start: 2023-04-27 | End: 2023-05-08 | Stop reason: SDUPTHER

## 2023-04-27 ASSESSMENT — FIBROSIS 4 INDEX: FIB4 SCORE: 0.69

## 2023-04-27 NOTE — ASSESSMENT & PLAN NOTE
Chronic, improving  Body mass index is 35.84 kg/m².  He has lost 6 pounds in the last week from dietary changes.

## 2023-04-27 NOTE — ASSESSMENT & PLAN NOTE
This is a new problem found on labs.  Testosterone was checked due to feelings of fatigue.  E consult placed to endocrinology and Dr. Brown does agree patient is a candidate for testosterone therapy.  Discussed side effects and lab monitoring with patient.  Patient is comfortable administering IM at home as he has done this with other medications for himself and his son.  Controlled substance agreement signed in clinic

## 2023-04-27 NOTE — PROGRESS NOTES
"Subjective:     CC:   Chief Complaint   Patient presents with    Follow-Up     Follow up on BP       HISTORY OF THE PRESENT ILLNESS: Kg is a pleasant 58 y.o. male here today to discuss his blood pressure as well as following up on low testosterone.    Problem   Low Testosterone   Obesity (Bmi 35.0-39.9 Without Comorbidity)    Patient has been making an active effort to lose weight.  He has cut down on carbs and is focusing on green vegetables and meat. His main focus is to eliminate sugar. He is avoiding fruit as well.      Htn (Hypertension)    Patient states he does monitor his BP at home and he averages 120s/70s.  Several follow-up visits in clinic, his blood pressure has been high.  He does manage daily with telmisartan 20 mg.  Patient states he was previously managed on telmisartan-hydrochlorothiazide, however, there were issues getting it refilled.  We did call and verify this with the pharmacy and they stated it has not been filled for several years.         Health Maintenance: Completed    ROS:  All systems negative expect as addressed in assessment and plan.     Objective:     Exam:  BP (!) 144/82 (BP Location: Right arm, Patient Position: Sitting, BP Cuff Size: Adult)   Pulse 69   Temp 36.9 °C (98.5 °F) (Temporal)   Ht 1.803 m (5' 11\")   Wt 117 kg (257 lb)   SpO2 95%   BMI 35.84 kg/m²  Body mass index is 35.84 kg/m².    Physical Exam  Constitutional:       Appearance: Normal appearance.   Cardiovascular:      Rate and Rhythm: Normal rate.   Pulmonary:      Effort: Pulmonary effort is normal.   Neurological:      Mental Status: He is alert. Mental status is at baseline.   Psychiatric:         Mood and Affect: Mood normal.         Behavior: Behavior normal.       Labs: Results reviewed from 12/12/22 and 02/20/23    Assessment & Plan:     58 y.o. male with the following -    1. Low testosterone  This is a new problem found on labs.  Testosterone was checked due to feelings of fatigue.  E consult " placed to endocrinology and Dr. Brown does agree patient is a candidate for testosterone therapy.  Discussed side effects and lab monitoring with patient.  Patient is comfortable administering IM at home as he has done this with other medications for himself and his son.  Controlled substance agreement signed in clinic  - Testosterone Cypionate 100 MG/ML Solution; Inject 1 mL into the shoulder, thigh, or buttocks every 7 days for 90 days.  Dispense: 10 mL; Refill: 0  - CBC WITHOUT DIFFERENTIAL; Future  - Testosterone, Free & Total, Adult Male (w/SHBG); Future  - PROSTATE SPECIFIC AG SCREENING; Future  - Controlled Substance Treatment Agreement    2. Encounter for monitoring testosterone replacement therapy  - CBC WITHOUT DIFFERENTIAL; Future  - Testosterone, Free & Total, Adult Male (w/SHBG); Future  - PROSTATE SPECIFIC AG SCREENING; Future  - Controlled Substance Treatment Agreement    3. Obesity (BMI 35.0-39.9 without comorbidity)  Chronic, improving  Body mass index is 35.84 kg/m².  He has lost 6 pounds in the last week from dietary changes.    4. Encounter for monitoring diuretic therapy  - Basic Metabolic Panel; Future    5. Primary hypertension  4. Encounter for monitoring diuretic therapy  Chronic, not controlled.  Patient has had multiple blood pressures in clinic greater than 140/80.  I am starting him on testosterone therapy, which provides additional concern for blood pressure control.  We will discontinue telmisartan 20 mg and initiate telmisartan-hydrochlorothiazide 40-12.5 mg, as patient states this worked well for him in the past.  We will obtain labs to assess potassium levels.  Current K+ 4.8 on labs.  - Basic Metabolic Panel; Future  - telmisartan-hydrochlorothiazide (MICARDIS HCT) 40-12.5 MG per tablet; Take 1 Tablet by mouth every day.  Dispense: 90 Tablet; Refill: 0    Patient was educated in proper administration of medication(s) ordered today including safety, possible SE, risks, benefits,  rationale and alternatives to therapy.   Supportive care, differential diagnoses, and indications for immediate follow-up discussed with patient.    Pathogenesis of diagnosis discussed including typical length and natural progression.    Instructed to return to clinic or nearest emergency department for any change in condition, further concerns, or worsening of symptoms.  Patient states understanding of the plan of care and discharge instructions.    Return in about 3 months (around 8/7/2023) for Follow-up BP, Testosterone labs.    I spent a total of 34 minutes with record review, exam, and communication with the patient, communication with other providers, and documentation of this encounter. This does not include time spent on separately billable procedures/tests.    I have placed the above orders and discussed them with an approved delegating provider.  The MA is performing the below orders under the direction of Dr. Bobo.    Please note that this dictation was created using voice recognition software. I have worked with consultants from the vendor as well as technical experts from [facility] to optimize the interface. I have made every reasonable attempt to correct obvious errors, but I expect that there are errors of grammar and possibly content that I did not discover before finalizing the note.

## 2023-05-08 ENCOUNTER — TELEPHONE (OUTPATIENT)
Dept: MEDICAL GROUP | Facility: PHYSICIAN GROUP | Age: 58
End: 2023-05-08
Payer: COMMERCIAL

## 2023-05-08 DIAGNOSIS — R79.89 LOW TESTOSTERONE: ICD-10-CM

## 2023-05-08 RX ORDER — TESTOSTERONE CYPIONATE 1000 MG/10ML
1 INJECTION, SOLUTION INTRAMUSCULAR
Qty: 12 ML | Refills: 0 | Status: SHIPPED | OUTPATIENT
Start: 2023-05-08 | End: 2023-08-06

## 2023-07-24 PROBLEM — M18.11 PRIMARY OSTEOARTHRITIS OF FIRST CARPOMETACARPAL JOINT OF RIGHT HAND: Status: ACTIVE | Noted: 2023-07-24

## 2024-01-29 PROBLEM — S83.241A TEAR OF MEDIAL MENISCUS OF RIGHT KNEE, CURRENT, INITIAL ENCOUNTER: Status: ACTIVE | Noted: 2024-01-29

## (undated) DEVICE — PADDING CAST 4 IN STERILE - 4 X 4 YDS (24/CA)

## (undated) DEVICE — SLEEVE, VASO, THIGH, MED

## (undated) DEVICE — BANDAGE ELASTIC 4 HONEYCOMB - 4"X5YD LF (20/CA)"

## (undated) DEVICE — TUBE E-T HI-LO CUFF 7.0MM (10EA/PK)

## (undated) DEVICE — SENSOR SPO2 NEO LNCS ADHESIVE (20/BX) SEE USER NOTES

## (undated) DEVICE — GLOVE BIOGEL SZ 6.5 SURGICAL PF LTX (50PR/BX 4BX/CA)

## (undated) DEVICE — GOWN SURGEONS X-LARGE - DISP. (30/CA)

## (undated) DEVICE — ELECTRODE DUAL RETURN W/ CORD - (50/PK)

## (undated) DEVICE — GLOVE BIOGEL PI INDICATOR SZ 7.0 SURGICAL PF LF - (50/BX 4BX/CA)

## (undated) DEVICE — GOWN WARMING STANDARD FLEX - (30/CA)

## (undated) DEVICE — BIT DRILL 2.0MM QC (4TX1+2TX2=8)

## (undated) DEVICE — SUTURE 2-0 VICRYL PLUS CT-1 - 8 X 18 INCH(12/BX)

## (undated) DEVICE — GLOVE BIOGEL INDICATOR SZ 8 SURGICAL PF LTX - (50/BX 4BX/CA)

## (undated) DEVICE — DRAPE C-ARM LARGE 41IN X 74 IN - (10/BX 2BX/CA)

## (undated) DEVICE — PROTECTOR ULNA NERVE - (36PR/CA)

## (undated) DEVICE — GVL 3 STAT DISPOSABLE - (10/BX)

## (undated) DEVICE — SODIUM CHL IRRIGATION 0.9% 1000ML (12EA/CA)

## (undated) DEVICE — STAPLER SKIN DISP - (6/BX 10BX/CA) VISISTAT

## (undated) DEVICE — KIT ANESTHESIA W/CIRCUIT & 3/LT BAG W/FILTER (20EA/CA)

## (undated) DEVICE — KIT ROOM DECONTAMINATION

## (undated) DEVICE — GLOVE BIOGEL PI ORTHO SZ 6 1/2 SURGICAL PF LF (40PR/BX)

## (undated) DEVICE — GLOVE BIOGEL SZ 7.5 SURGICAL PF LTX - (50PR/BX 4BX/CA)

## (undated) DEVICE — GLOVE BIOGEL INDICATOR SZ 7.5 SURGICAL PF LTX - (50PR/BX 4BX/CA)

## (undated) DEVICE — SET EXTENSION WITH 2 PORTS (48EA/CA) ***PART #2C8610 IS A SUBSTITUTE*****

## (undated) DEVICE — TOWELS CLOTH SURGICAL - (4/PK 20PK/CA)

## (undated) DEVICE — BLOCK

## (undated) DEVICE — SET LEADWIRE 5 LEAD BEDSIDE DISPOSABLE ECG (1SET OF 5/EA)

## (undated) DEVICE — HEAD HOLDER JUNIOR/ADULT

## (undated) DEVICE — LACTATED RINGERS INJ 1000 ML - (14EA/CA 60CA/PF)

## (undated) DEVICE — PACK LOWER EXTREMITY - (2/CA)

## (undated) DEVICE — DRAPE 36X28IN RAD CARM BND BG - (25/CA) O

## (undated) DEVICE — CANISTER SUCTION 3000ML MECHANICAL FILTER AUTO SHUTOFF MEDI-VAC NONSTERILE LF DISP  (40EA/CA)

## (undated) DEVICE — SPLINT PLASTER 5 IN X 30 IN - (50EA/BX 6BX/CA)

## (undated) DEVICE — CHLORAPREP 26 ML APPLICATOR - ORANGE TINT(25/CA)

## (undated) DEVICE — PAD LAP STERILE 18 X 18 - (5/PK 40PK/CA)

## (undated) DEVICE — TUBING CLEARLINK DUO-VENT - C-FLO (48EA/CA)

## (undated) DEVICE — SUCTION INSTRUMENT YANKAUER BULBOUS TIP W/O VENT (50EA/CA)

## (undated) DEVICE — GLOVE BIOGEL SZ 6 PF LATEX - (50EA/BX 4BX/CA)

## (undated) DEVICE — GLOVE BIOGEL INDICATOR SZ 7SURGICAL PF LTX - (50/BX 4BX/CA)

## (undated) DEVICE — BIT DRILL SHORT W/QC 2.7MM (1TX2+1TX2=4)

## (undated) DEVICE — ELECTRODE 850 FOAM ADHESIVE - HYDROGEL RADIOTRNSPRNT (50/PK)

## (undated) DEVICE — SUTURE 0 VICRYL PLUS CT-1 - 8 X 18 INCH (12/BX)

## (undated) DEVICE — SUTURE GENERAL

## (undated) DEVICE — SUTURE 3-0 ETHILON FS-1 - (36/BX) 30 INCH

## (undated) DEVICE — DRAPE LARGE 3 QUARTER - (20/CA)

## (undated) DEVICE — MASK ANESTHESIA ADULT  - (100/CA)

## (undated) DEVICE — NEPTUNE 4 PORT MANIFOLD - (20/PK)